# Patient Record
Sex: MALE | Race: WHITE | NOT HISPANIC OR LATINO | Employment: FULL TIME | URBAN - METROPOLITAN AREA
[De-identification: names, ages, dates, MRNs, and addresses within clinical notes are randomized per-mention and may not be internally consistent; named-entity substitution may affect disease eponyms.]

---

## 2017-01-06 ENCOUNTER — OFFICE VISIT (OUTPATIENT)
Dept: FAMILY MEDICINE CLINIC | Facility: CLINIC | Age: 52
End: 2017-01-06

## 2017-01-06 VITALS
BODY MASS INDEX: 36.95 KG/M2 | WEIGHT: 287.93 LBS | DIASTOLIC BLOOD PRESSURE: 80 MMHG | HEIGHT: 74 IN | SYSTOLIC BLOOD PRESSURE: 130 MMHG | HEART RATE: 82 BPM | OXYGEN SATURATION: 94 %

## 2017-01-06 DIAGNOSIS — E66.9 NON MORBID OBESITY, UNSPECIFIED OBESITY TYPE: Primary | ICD-10-CM

## 2017-01-06 PROBLEM — G47.33 OBSTRUCTIVE SLEEP APNEA SYNDROME: Status: ACTIVE | Noted: 2017-01-06

## 2017-01-06 LAB
ALBUMIN SERPL-MCNC: 5.1 G/DL (ref 3.5–5.2)
ALBUMIN/GLOB SERPL: 2.2 G/DL
ALP SERPL-CCNC: 65 U/L (ref 39–117)
ALT SERPL-CCNC: 48 U/L (ref 1–41)
AST SERPL-CCNC: 26 U/L (ref 1–40)
BILIRUB SERPL-MCNC: 0.6 MG/DL (ref 0.1–1.2)
BUN SERPL-MCNC: 14 MG/DL (ref 6–20)
BUN/CREAT SERPL: 13.6 (ref 7–25)
CALCIUM SERPL-MCNC: 9.9 MG/DL (ref 8.6–10.5)
CHLORIDE SERPL-SCNC: 99 MMOL/L (ref 98–107)
CHOLEST SERPL-MCNC: 269 MG/DL (ref 0–200)
CHOLEST/HDLC SERPL: 8.68 {RATIO}
CO2 SERPL-SCNC: 24.7 MMOL/L (ref 22–29)
CREAT SERPL-MCNC: 1.03 MG/DL (ref 0.76–1.27)
ERYTHROCYTE [DISTWIDTH] IN BLOOD BY AUTOMATED COUNT: 13.1 % (ref 11.5–14.5)
GLOBULIN SER CALC-MCNC: 2.3 GM/DL
GLUCOSE SERPL-MCNC: 106 MG/DL (ref 65–99)
HCT VFR BLD AUTO: 48 % (ref 40.4–52.2)
HDLC SERPL-MCNC: 31 MG/DL (ref 40–60)
HGB BLD-MCNC: 16.1 G/DL (ref 13.7–17.6)
LDLC SERPL CALC-MCNC: 196 MG/DL (ref 0–100)
MCH RBC QN AUTO: 32.4 PG (ref 27–32.7)
MCHC RBC AUTO-ENTMCNC: 33.5 G/DL (ref 32.6–36.4)
MCV RBC AUTO: 96.6 FL (ref 79.8–96.2)
PLATELET # BLD AUTO: 255 10*3/MM3 (ref 140–500)
POTASSIUM SERPL-SCNC: 4.5 MMOL/L (ref 3.5–5.2)
PROT SERPL-MCNC: 7.4 G/DL (ref 6–8.5)
RBC # BLD AUTO: 4.97 10*6/MM3 (ref 4.6–6)
SODIUM SERPL-SCNC: 139 MMOL/L (ref 136–145)
TRIGL SERPL-MCNC: 209 MG/DL (ref 0–150)
VLDLC SERPL CALC-MCNC: 41.8 MG/DL (ref 5–40)
WBC # BLD AUTO: 9.1 10*3/MM3 (ref 4.5–10.7)

## 2017-01-06 PROCEDURE — 99214 OFFICE O/P EST MOD 30 MIN: CPT | Performed by: NURSE PRACTITIONER

## 2017-01-06 RX ORDER — IBUPROFEN 200 MG
200 TABLET ORAL EVERY 6 HOURS PRN
COMMUNITY
End: 2018-04-26 | Stop reason: HOSPADM

## 2017-01-06 RX ORDER — UREA 10 %
10 LOTION (ML) TOPICAL NIGHTLY
COMMUNITY
End: 2020-02-10

## 2017-01-06 NOTE — PROGRESS NOTES
Subjective   Janak Morris is a 51 y.o. male who presents today for:    Apnea (NP)    HPI Comments: Mr. Morris presents today to establish care at this practice. He is a [employer] employee whom I have seen in the past for acute illnesses. His medical history includes sleep apnea and he uses a C- PAP when he sleeps at night. He has no other significant illnesses. He is not on any medication except for OTC medications to help him sleep. He reports no known allergies. His family history includes cancer, lung disease, kidney disease and alcoholism. Both of his parents are , his mother  due to an accident. He voices no concerns today.     I have reviewed the patient's medical history in detail and updated the computerized patient record.    Mr. Morris  reports that he has been smoking Cigarettes.  He has a 30.00 pack-year smoking history. He has never used smokeless tobacco. He reports that he drinks alcohol. He reports that he does not use illicit drugs.         Current Outpatient Prescriptions:   •  Doxylamine Succinate, Sleep, (SLEEP AID PO), Take  by mouth., Disp: , Rfl:   •  ibuprofen (ADVIL,MOTRIN) 200 MG tablet, Take 200 mg by mouth Every 6 (Six) Hours As Needed for mild pain (1-3)., Disp: , Rfl:   •  melatonin 1 MG tablet, Take 5 mg by mouth Every Night., Disp: , Rfl:       The following portions of the patient's history were reviewed and updated as appropriate: allergies, current medications, past social history and problem list.    Review of Systems   Constitutional: Negative.    HENT: Negative.    Eyes: Negative.    Respiratory: Negative.    Cardiovascular: Negative.    Gastrointestinal: Negative.    Endocrine: Negative.    Musculoskeletal: Negative.    Skin: Negative.    Neurological: Negative.    Psychiatric/Behavioral: Negative.          Objective   Vitals:    17 0907   BP: 130/80   BP Location: Right arm   Patient Position: Sitting   Cuff Size: Large Adult   Pulse: 82  "  SpO2: 94%   Weight: 287 lb 14.9 oz (131 kg)   Height: 74\" (188 cm)     Physical Exam   Constitutional: He is oriented to person, place, and time. He appears well-developed and well-nourished.   HENT:   Head: Normocephalic.   Right Ear: External ear normal.   Left Ear: External ear normal.   Nose: Nose normal.   Mouth/Throat: Oropharynx is clear and moist.   Eyes: Conjunctivae and EOM are normal. Pupils are equal, round, and reactive to light.   Neck: Normal range of motion. Neck supple. No JVD present. No tracheal deviation present. No thyromegaly present.   Cardiovascular: Normal rate, regular rhythm, normal heart sounds and intact distal pulses.    Pulmonary/Chest: Effort normal and breath sounds normal.   Abdominal: Soft. Bowel sounds are normal. He exhibits no distension and no mass. There is no tenderness. There is no rebound and no guarding. No hernia.   Musculoskeletal: Normal range of motion. He exhibits no edema, tenderness or deformity.   Lymphadenopathy:     He has no cervical adenopathy.   Neurological: He is alert and oriented to person, place, and time.   Skin: Skin is warm and dry.   Psychiatric:   No acute distress.   Vitals reviewed.        Assessment/Plan   Janak was seen today for apnea.    Diagnoses and all orders for this visit:    Non morbid obesity, unspecified obesity type  -     CBC (No Diff)  -     Comprehensive Metabolic Panel  -     Lipid Panel With / Chol / HDL Ratio    1. Physical examination is within normal limits. BMI is 37 %.   2. Labs as noted above. I will call him with the results and make changes to his plan of care if indicated.  3. I will see him in 1 year or as needed.  "

## 2017-01-06 NOTE — MR AVS SNAPSHOT
"Janak Morris   1/6/2017 9:00 AM   Office Visit    Dept Phone:  [phone]   Encounter #:  [id number]    Provider:  BASIL Dangelo   Department:  [facility] INTERNAL MEDICINE                Your Full Care Plan              Your Updated Medication List          This list is accurate as of: 1/6/17  9:31 AM.  Always use your most recent med list.                ibuprofen 200 MG tablet   Commonly known as:  ADVIL,MOTRIN       melatonin 1 MG tablet       SLEEP AID PO               We Performed the Following     CBC (No Diff)     Comprehensive Metabolic Panel     Lipid Panel With / Chol / HDL Ratio       You Were Diagnosed With        Codes Comments    Non morbid obesity, unspecified obesity type    -  Primary ICD-10-CM: E66.9  ICD-9-CM: 278.00       Instructions     None    Patient Instructions History      Upcoming Appointments     Visit Type Date Time Department    NEW PATIENT 1/6/2017  9:00 AM BERE JONES      [facility] Signup     Our records indicate that you have declined [facility] signup. If you would like to sign up for [employer], please email [email or web address] or call [phone] to obtain an activation code.             Other Info from Your Visit           Allergies     No Known Allergies      Reason for Visit     Apnea NP      Vital Signs     Blood Pressure Pulse Height Weight Oxygen Saturation Body Mass Index    130/80 (BP Location: Right arm, Patient Position: Sitting, Cuff Size: Large Adult) 82 74\" (188 cm) 287 lb 14.9 oz (131 kg) 94% 36.97 kg/m2    Smoking Status                   Current Every Day Smoker           Problems and Diagnoses Noted     Non morbid obesity, unspecified obesity type    -  Primary        "

## 2017-01-19 ENCOUNTER — OFFICE VISIT (OUTPATIENT)
Dept: FAMILY MEDICINE CLINIC | Facility: CLINIC | Age: 52
End: 2017-01-19

## 2017-01-19 VITALS
HEART RATE: 85 BPM | OXYGEN SATURATION: 97 % | DIASTOLIC BLOOD PRESSURE: 84 MMHG | SYSTOLIC BLOOD PRESSURE: 132 MMHG | HEIGHT: 74 IN | BODY MASS INDEX: 37.55 KG/M2 | WEIGHT: 292.6 LBS

## 2017-01-19 DIAGNOSIS — E78.5 HYPERLIPIDEMIA, UNSPECIFIED HYPERLIPIDEMIA TYPE: Primary | ICD-10-CM

## 2017-01-19 PROCEDURE — 99213 OFFICE O/P EST LOW 20 MIN: CPT | Performed by: NURSE PRACTITIONER

## 2017-01-19 NOTE — PROGRESS NOTES
Subjective   Janak Morris is a 51 y.o. male who presents today for:    Hyperlipidemia (Review Labs)    HPI Comments: Mr. Morris is here today to review and discuss his lab results. He reports a history of hyperlipidemia.    Hyperlipidemia   This is a chronic problem. The current episode started more than 1 year ago. The problem is uncontrolled. Recent lipid tests were reviewed and are high. Exacerbating diseases include obesity. Factors aggravating his hyperlipidemia include fatty foods. (None) Treatments tried: none. The current treatment provides no improvement of lipids. Compliance problems include adherence to diet (he said he won't take cholesterol medications).  Risk factors for coronary artery disease include dyslipidemia, male sex and obesity.      I have reviewed the patient's medical history in detail and updated the computerized patient record.    Mr. Morris  reports that he has been smoking Cigarettes.  He has a 30.00 pack-year smoking history. He has never used smokeless tobacco. He reports that he drinks alcohol. He reports that he does not use illicit drugs.         Current Outpatient Prescriptions:   •  Doxylamine Succinate, Sleep, (SLEEP AID PO), Take  by mouth., Disp: , Rfl:   •  ibuprofen (ADVIL,MOTRIN) 200 MG tablet, Take 200 mg by mouth Every 6 (Six) Hours As Needed for mild pain (1-3)., Disp: , Rfl:   •  melatonin 1 MG tablet, Take 5 mg by mouth Every Night., Disp: , Rfl:       The following portions of the patient's history were reviewed and updated as appropriate: allergies, current medications, past social history and problem list.    Review of Systems   Constitutional: Negative.    Respiratory: Negative.    Cardiovascular: Negative.    Skin: Negative.    Neurological: Negative.          Objective   Vitals:    01/19/17 0809   BP: 132/84   BP Location: Left arm   Patient Position: Sitting   Cuff Size: Large Adult   Pulse: 85   SpO2: 97%   Weight: 292 lb 9.6 oz (133 kg)   Height:  "74\" (188 cm)     Physical Exam   Constitutional: He is oriented to person, place, and time. He appears well-developed and well-nourished.   Cardiovascular: Normal rate, regular rhythm, normal heart sounds and intact distal pulses.    Pulmonary/Chest: Effort normal and breath sounds normal.   Neurological: He is alert and oriented to person, place, and time.   Skin: Skin is warm and dry.   Vitals reviewed.        Assessment/Plan   Janak was seen today for hyperlipidemia.    Diagnoses and all orders for this visit:    Hyperlipidemia, unspecified hyperlipidemia type  -     Lipid Panel With / Chol / HDL Ratio; Future  -     Basic Metabolic Panel; Future    1. I have reviewed his labs with him today focusing on his lipid levels. He states the levels are actually lower than what they were in the past. He told me that he will not take cholesterol lowering medications nor will he make drastic life style changes such as decreasing/limiting the amounts of meats in his diet. 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  2. He is to return in 6 months to follow up on his hyperlipidemia.  "

## 2017-01-19 NOTE — MR AVS SNAPSHOT
"Janak Morris   1/19/2017 8:00 AM   Office Visit    Dept Phone:  [phone]   Encounter #:  [id number]    Provider:  BASIL Dangelo   Department:  [facility] INTERNAL MEDICINE                Your Full Care Plan              Your Updated Medication List          This list is accurate as of: 1/19/17  8:26 AM.  Always use your most recent med list.                ibuprofen 200 MG tablet   Commonly known as:  ADVIL,MOTRIN       melatonin 1 MG tablet       SLEEP AID PO               You Were Diagnosed With        Codes Comments    Hyperlipidemia, unspecified hyperlipidemia type    -  Primary ICD-10-CM: E78.5  ICD-9-CM: 272.4       Instructions    Fasting labs one week prior to next scheduled office visit.     Patient Instructions History      Upcoming Appointments     Visit Type Date Time Department    OFFICE VISIT 1/19/2017  8:00 AM [facility]    PHYSICAL 1/16/2018  8:00 AM [email or web address] Signup     Our records indicate that you have declined [facility] signup. If you would like to sign up for [employer], please email [email or web address] or call [phone] to obtain an activation code.             Other Info from Your Visit           Your Appointments     Jan 16, 2018  8:00 AM EST   Physical with BASIL Dangelo   [facility] INTERNAL MEDICINE (--)    [address] [place] [address]   [phone]           Arrive 15 minutes prior to appointment.              Allergies     No Known Allergies      Reason for Visit     Hyperlipidemia Review Labs      Vital Signs     Blood Pressure Pulse Height Weight Oxygen Saturation Body Mass Index    132/84 (BP Location: Left arm, Patient Position: Sitting, Cuff Size: Large Adult) 85 74\" (188 cm) 292 lb 9.6 oz (133 kg) 97% 37.57 kg/m2    Smoking Status                   Current Every Day Smoker           Problems and Diagnoses Noted    "    High cholesterol or triglycerides    -  Primary

## 2017-06-19 ENCOUNTER — OFFICE VISIT (OUTPATIENT)
Dept: FAMILY MEDICINE CLINIC | Facility: CLINIC | Age: 52
End: 2017-06-19

## 2017-06-19 VITALS
BODY MASS INDEX: 37.94 KG/M2 | SYSTOLIC BLOOD PRESSURE: 138 MMHG | WEIGHT: 295.6 LBS | HEART RATE: 76 BPM | HEIGHT: 74 IN | TEMPERATURE: 98.1 F | OXYGEN SATURATION: 90 % | DIASTOLIC BLOOD PRESSURE: 82 MMHG

## 2017-06-19 DIAGNOSIS — I10 ESSENTIAL HYPERTENSION: Primary | ICD-10-CM

## 2017-06-19 PROCEDURE — 99213 OFFICE O/P EST LOW 20 MIN: CPT | Performed by: NURSE PRACTITIONER

## 2017-06-19 RX ORDER — HYDROCODONE BITARTRATE AND ACETAMINOPHEN 5; 325 MG/1; MG/1
1 TABLET ORAL EVERY 6 HOURS PRN
COMMUNITY
End: 2018-01-19

## 2017-06-19 RX ORDER — LISINOPRIL 10 MG/1
10 TABLET ORAL DAILY
Qty: 30 TABLET | Refills: 5 | Status: SHIPPED | OUTPATIENT
Start: 2017-06-19 | End: 2017-10-24 | Stop reason: SDUPTHER

## 2017-06-19 NOTE — PROGRESS NOTES
Subjective   Janak Morris is a 52 y.o. male who presents today for:    Hypertension    Hypertension   This is a chronic problem. The current episode started more than 1 year ago. The problem has been waxing and waning since onset. The problem is uncontrolled. Associated symptoms include anxiety, blurred vision (slightly), headaches (at times), malaise/fatigue (feels more tired than usual) and shortness of breath (mild with heavy exertion). Pertinent negatives include no chest pain, palpitations or peripheral edema. Agents associated with hypertension include NSAIDs. Risk factors for coronary artery disease include male gender and obesity. Past treatments include nothing. The current treatment provides no improvement. Compliance problems include exercise and diet.       I have reviewed the patient's medical history in detail and updated the computerized patient record.    Mr. Morris  reports that he has been smoking Cigarettes.  He has a 30.00 pack-year smoking history. He has never used smokeless tobacco. He reports that he drinks alcohol. He reports that he does not use illicit drugs.         Current Outpatient Prescriptions:   •  Doxylamine Succinate, Sleep, (SLEEP AID PO), Take  by mouth., Disp: , Rfl:   •  ibuprofen (ADVIL,MOTRIN) 200 MG tablet, Take 200 mg by mouth Every 6 (Six) Hours As Needed for mild pain (1-3)., Disp: , Rfl:   •  melatonin 1 MG tablet, Take 5 mg by mouth Every Night., Disp: , Rfl:   •  HYDROcodone-acetaminophen (NORCO) 5-325 MG per tablet, Take 1 tablet by mouth Every 6 (Six) Hours As Needed., Disp: , Rfl:       The following portions of the patient's history were reviewed and updated as appropriate: allergies, current medications, past social history and problem list.    Review of Systems   Constitutional: Positive for malaise/fatigue (feels more tired than usual). Negative for fatigue.   Eyes: Positive for blurred vision (slightly).   Respiratory: Positive for shortness of  "breath (mild with heavy exertion).    Cardiovascular: Negative.  Negative for chest pain, palpitations and leg swelling.   Skin: Negative.    Neurological: Positive for headaches (at times).         Objective   Vitals:    06/19/17 0809   BP: 138/82   BP Location: Left arm   Patient Position: Sitting   Cuff Size: Adult   Pulse: 76   Temp: 98.1 °F (36.7 °C)   TempSrc: Oral   SpO2: 90%   Weight: 295 lb 9.6 oz (134 kg)   Height: 74\" (188 cm)     Physical Exam   Constitutional: He is oriented to person, place, and time. He appears well-developed and well-nourished.   Eyes: Conjunctivae and EOM are normal. Pupils are equal, round, and reactive to light.   Neck: Normal range of motion. Neck supple. No JVD present. No tracheal deviation present. No thyromegaly present.   Cardiovascular: Normal rate, regular rhythm, normal heart sounds and intact distal pulses.  Exam reveals no gallop and no friction rub.    No murmur heard.  Pulmonary/Chest: Effort normal and breath sounds normal. No respiratory distress. He has no wheezes. He has no rales.   Musculoskeletal: Normal range of motion. He exhibits no edema.   Lymphadenopathy:     He has no cervical adenopathy.   Neurological: He is alert and oriented to person, place, and time.   Skin: Skin is warm and dry.   Psychiatric:   No acute distress   Vitals reviewed.        Assessment/Plan   Janak was seen today for hypertension.    Diagnoses and all orders for this visit:    Essential hypertension  -     lisinopril (PRINIVIL,ZESTRIL) 10 MG tablet; Take 1 tablet by mouth Daily.    1. He reports his blood pressure is running in the 150/80 to 170/90 when at home. Worsening over the past month or so. I will start him on Lisinopril 10 mg daily. I have also advised him to substitute Acetaminophen for the Ibuprofen he is taking for pain. I have asked him to continue to keep a log of his blood pressure.   2. He is to return in 1 month at his next scheduled appointment with fasting " labs the week before.

## 2017-07-01 ENCOUNTER — RESULTS ENCOUNTER (OUTPATIENT)
Dept: FAMILY MEDICINE CLINIC | Facility: CLINIC | Age: 52
End: 2017-07-01

## 2017-07-01 DIAGNOSIS — E78.5 HYPERLIPIDEMIA, UNSPECIFIED HYPERLIPIDEMIA TYPE: ICD-10-CM

## 2017-07-13 LAB
BUN SERPL-MCNC: 13 MG/DL (ref 6–20)
BUN/CREAT SERPL: 14.6 (ref 7–25)
CALCIUM SERPL-MCNC: 9.8 MG/DL (ref 8.6–10.5)
CHLORIDE SERPL-SCNC: 101 MMOL/L (ref 98–107)
CHOLEST SERPL-MCNC: 238 MG/DL (ref 0–200)
CHOLEST/HDLC SERPL: 7.93 {RATIO}
CO2 SERPL-SCNC: 24.5 MMOL/L (ref 22–29)
CREAT SERPL-MCNC: 0.89 MG/DL (ref 0.76–1.27)
GLUCOSE SERPL-MCNC: 112 MG/DL (ref 65–99)
HDLC SERPL-MCNC: 30 MG/DL (ref 40–60)
LDLC SERPL CALC-MCNC: 167 MG/DL (ref 0–100)
POTASSIUM SERPL-SCNC: 4.2 MMOL/L (ref 3.5–5.2)
SODIUM SERPL-SCNC: 139 MMOL/L (ref 136–145)
TRIGL SERPL-MCNC: 207 MG/DL (ref 0–150)
VLDLC SERPL CALC-MCNC: 41.4 MG/DL (ref 5–40)

## 2017-07-17 RX ORDER — SIMVASTATIN 20 MG
20 TABLET ORAL NIGHTLY
Qty: 30 TABLET | Refills: 5 | Status: SHIPPED | OUTPATIENT
Start: 2017-07-17 | End: 2017-07-19 | Stop reason: ALTCHOICE

## 2017-07-19 ENCOUNTER — OFFICE VISIT (OUTPATIENT)
Dept: FAMILY MEDICINE CLINIC | Facility: CLINIC | Age: 52
End: 2017-07-19

## 2017-07-19 VITALS
BODY MASS INDEX: 36.57 KG/M2 | HEIGHT: 74 IN | OXYGEN SATURATION: 93 % | DIASTOLIC BLOOD PRESSURE: 76 MMHG | WEIGHT: 285 LBS | SYSTOLIC BLOOD PRESSURE: 120 MMHG | HEART RATE: 67 BPM

## 2017-07-19 DIAGNOSIS — I10 ESSENTIAL HYPERTENSION: ICD-10-CM

## 2017-07-19 DIAGNOSIS — E78.5 HYPERLIPIDEMIA, UNSPECIFIED HYPERLIPIDEMIA TYPE: Primary | ICD-10-CM

## 2017-07-19 PROCEDURE — 99214 OFFICE O/P EST MOD 30 MIN: CPT | Performed by: NURSE PRACTITIONER

## 2017-07-19 RX ORDER — ROSUVASTATIN CALCIUM 5 MG/1
5 TABLET, COATED ORAL DAILY
Qty: 30 TABLET | Refills: 5 | Status: SHIPPED | OUTPATIENT
Start: 2017-07-19 | End: 2017-10-24 | Stop reason: SDUPTHER

## 2017-07-19 NOTE — PROGRESS NOTES
Subjective   Janak Morris is a 52 y.o. male.     Hypertension   This is a chronic problem. The current episode started more than 1 month ago. The problem has been gradually improving since onset. The problem is controlled. Associated symptoms include headaches (rarely). Pertinent negatives include no anxiety, blurred vision, chest pain, palpitations, peripheral edema or shortness of breath. Agents associated with hypertension include NSAIDs. Risk factors for coronary artery disease include male gender and obesity. Past treatments include ACE inhibitors. The current treatment provides significant improvement. There are no compliance problems.    Hyperlipidemia   This is a new problem. This is a new diagnosis. The problem is uncontrolled. Recent lipid tests were reviewed and are high. Pertinent negatives include no chest pain or shortness of breath. He is currently on no antihyperlipidemic treatment. The current treatment provides no improvement of lipids. There are no compliance problems.  Risk factors for coronary artery disease include hypertension, dyslipidemia, male sex and obesity.     I have reviewed the patient's medical history in detail and updated the computerized patient record.    The following portions of the patient's history were reviewed and updated as appropriate: allergies, current medications, past family history, past medical history, past social history, past surgical history and problem list.      Current Outpatient Prescriptions:   •  Doxylamine Succinate, Sleep, (SLEEP AID PO), Take  by mouth., Disp: , Rfl:   •  ibuprofen (ADVIL,MOTRIN) 200 MG tablet, Take 200 mg by mouth Every 6 (Six) Hours As Needed for mild pain (1-3)., Disp: , Rfl:   •  lisinopril (PRINIVIL,ZESTRIL) 10 MG tablet, Take 1 tablet by mouth Daily., Disp: 30 tablet, Rfl: 5  •  melatonin 1 MG tablet, Take 5 mg by mouth Every Night., Disp: , Rfl:   •  HYDROcodone-acetaminophen (NORCO) 5-325 MG per tablet, Take 1 tablet by  mouth Every 6 (Six) Hours As Needed., Disp: , Rfl:   •  rosuvastatin (CRESTOR) 5 MG tablet, Take 1 tablet by mouth Daily., Disp: 30 tablet, Rfl: 5     Review of Systems   Constitutional: Negative.    Eyes: Negative for blurred vision.   Respiratory: Negative.  Negative for shortness of breath.    Cardiovascular: Negative.  Negative for chest pain, palpitations and leg swelling.   Musculoskeletal: Negative.    Skin: Negative.    Neurological: Positive for headaches (rarely). Negative for dizziness, weakness, light-headedness and numbness.       Objective    Vitals:    07/19/17 0806   BP: 120/76   Pulse: 67   SpO2: 93%     Physical Exam   Constitutional: He is oriented to person, place, and time. He appears well-developed and well-nourished.   Cardiovascular: Normal rate, regular rhythm, normal heart sounds and intact distal pulses.  Exam reveals no gallop and no friction rub.    No murmur heard.  Pulmonary/Chest: Effort normal and breath sounds normal. No respiratory distress. He has no wheezes. He has no rales.   Musculoskeletal: Normal range of motion. He exhibits no edema.   Neurological: He is alert and oriented to person, place, and time.   Skin: Skin is warm and dry.   Psychiatric:   No acute distress   Vitals reviewed.      Assessment/Plan   Janak was seen today for hypertension and hyperlipidemia.    Diagnoses and all orders for this visit:    Hyperlipidemia, unspecified hyperlipidemia type  -     rosuvastatin (CRESTOR) 5 MG tablet; Take 1 tablet by mouth Daily.  -     Lipid Panel With / Chol / HDL Ratio; Future    Essential hypertension       1. Hypertension. His blood pressure today in the office is 120/76 and his HR is 76. He is to continue with Lisinopril 10 mg daily. He is also to continue with life style changes as we discussed through nutrition and increasing daily activity beyond what he does at work.   2. Hyperlipidemia. I have reviewed his labs with him today. All of his lipid levels are above  normal limits. I had prescribed him Simvastatin to start, but patient does not want to take it, he would prefer taking Rosuvastatin. I will start him on Rosuvastatin 5 mg daily.   3. He is to return in 6 months for follow up on blood pressure and lipids. He is to have fasting labs the week before.

## 2017-08-18 ENCOUNTER — TREATMENT (OUTPATIENT)
Dept: PHYSICAL THERAPY | Facility: CLINIC | Age: 52
End: 2017-08-18

## 2017-08-18 DIAGNOSIS — Z02.1 PRE-EMPLOYMENT HEALTH SCREENING EXAMINATION: Primary | ICD-10-CM

## 2017-08-18 PROCEDURE — PDS: Performed by: PHYSICAL THERAPIST

## 2017-10-24 DIAGNOSIS — I10 ESSENTIAL HYPERTENSION: ICD-10-CM

## 2017-10-24 DIAGNOSIS — E78.5 HYPERLIPIDEMIA, UNSPECIFIED HYPERLIPIDEMIA TYPE: ICD-10-CM

## 2017-10-24 RX ORDER — ROSUVASTATIN CALCIUM 5 MG/1
5 TABLET, COATED ORAL DAILY
Qty: 10 TABLET | Refills: 0 | Status: SHIPPED | OUTPATIENT
Start: 2017-10-24 | End: 2017-11-02 | Stop reason: SDUPTHER

## 2017-10-24 RX ORDER — LISINOPRIL 10 MG/1
10 TABLET ORAL DAILY
Qty: 10 TABLET | Refills: 0 | Status: SHIPPED | OUTPATIENT
Start: 2017-10-24 | End: 2017-11-02 | Stop reason: SDUPTHER

## 2017-11-02 DIAGNOSIS — I10 ESSENTIAL HYPERTENSION: ICD-10-CM

## 2017-11-02 DIAGNOSIS — E78.5 HYPERLIPIDEMIA, UNSPECIFIED HYPERLIPIDEMIA TYPE: ICD-10-CM

## 2017-11-02 RX ORDER — ROSUVASTATIN CALCIUM 5 MG/1
TABLET, COATED ORAL
Qty: 10 TABLET | Refills: 0 | Status: SHIPPED | OUTPATIENT
Start: 2017-11-02 | End: 2017-11-14 | Stop reason: SDUPTHER

## 2017-11-02 RX ORDER — LISINOPRIL 10 MG/1
TABLET ORAL
Qty: 10 TABLET | Refills: 0 | Status: SHIPPED | OUTPATIENT
Start: 2017-11-02 | End: 2017-11-14 | Stop reason: SDUPTHER

## 2017-11-14 DIAGNOSIS — I10 ESSENTIAL HYPERTENSION: ICD-10-CM

## 2017-11-14 DIAGNOSIS — E78.5 HYPERLIPIDEMIA, UNSPECIFIED HYPERLIPIDEMIA TYPE: ICD-10-CM

## 2017-11-14 RX ORDER — ROSUVASTATIN CALCIUM 5 MG/1
5 TABLET, COATED ORAL DAILY
Qty: 90 TABLET | Refills: 1 | Status: SHIPPED | OUTPATIENT
Start: 2017-11-14 | End: 2018-05-29 | Stop reason: SDUPTHER

## 2017-11-14 RX ORDER — LISINOPRIL 10 MG/1
10 TABLET ORAL DAILY
Qty: 90 TABLET | Refills: 1 | Status: SHIPPED | OUTPATIENT
Start: 2017-11-14 | End: 2018-05-29 | Stop reason: SDUPTHER

## 2018-01-01 ENCOUNTER — RESULTS ENCOUNTER (OUTPATIENT)
Dept: FAMILY MEDICINE CLINIC | Facility: CLINIC | Age: 53
End: 2018-01-01

## 2018-01-01 DIAGNOSIS — E78.5 HYPERLIPIDEMIA, UNSPECIFIED HYPERLIPIDEMIA TYPE: ICD-10-CM

## 2018-01-19 ENCOUNTER — OFFICE VISIT (OUTPATIENT)
Dept: FAMILY MEDICINE CLINIC | Facility: CLINIC | Age: 53
End: 2018-01-19

## 2018-01-19 VITALS
WEIGHT: 289.4 LBS | HEART RATE: 85 BPM | HEIGHT: 74 IN | OXYGEN SATURATION: 95 % | SYSTOLIC BLOOD PRESSURE: 120 MMHG | TEMPERATURE: 98.1 F | DIASTOLIC BLOOD PRESSURE: 72 MMHG | BODY MASS INDEX: 37.14 KG/M2

## 2018-01-19 DIAGNOSIS — Z00.00 ENCOUNTER FOR ANNUAL PHYSICAL EXAM: ICD-10-CM

## 2018-01-19 DIAGNOSIS — R73.01 ELEVATED FASTING GLUCOSE: ICD-10-CM

## 2018-01-19 DIAGNOSIS — E78.5 HYPERLIPIDEMIA, UNSPECIFIED HYPERLIPIDEMIA TYPE: Primary | ICD-10-CM

## 2018-01-19 DIAGNOSIS — I10 ESSENTIAL HYPERTENSION: ICD-10-CM

## 2018-01-19 DIAGNOSIS — Z12.11 SCREENING FOR COLON CANCER: ICD-10-CM

## 2018-01-19 DIAGNOSIS — Z12.5 SCREENING FOR PROSTATE CANCER: ICD-10-CM

## 2018-01-19 LAB
ALBUMIN SERPL-MCNC: 4.6 G/DL (ref 3.5–5.2)
ALBUMIN/GLOB SERPL: 1.8 G/DL
ALP SERPL-CCNC: 69 U/L (ref 39–117)
ALT SERPL-CCNC: 37 U/L (ref 1–41)
AST SERPL-CCNC: 20 U/L (ref 1–40)
BILIRUB SERPL-MCNC: 0.6 MG/DL (ref 0.1–1.2)
BUN SERPL-MCNC: 9 MG/DL (ref 6–20)
BUN/CREAT SERPL: 10.2 (ref 7–25)
CALCIUM SERPL-MCNC: 9.7 MG/DL (ref 8.6–10.5)
CHLORIDE SERPL-SCNC: 102 MMOL/L (ref 98–107)
CHOLEST SERPL-MCNC: 150 MG/DL (ref 0–200)
CHOLEST/HDLC SERPL: 4.69 {RATIO}
CO2 SERPL-SCNC: 25.9 MMOL/L (ref 22–29)
CREAT SERPL-MCNC: 0.88 MG/DL (ref 0.76–1.27)
ERYTHROCYTE [DISTWIDTH] IN BLOOD BY AUTOMATED COUNT: 13.3 % (ref 11.5–14.5)
GLOBULIN SER CALC-MCNC: 2.6 GM/DL
GLUCOSE SERPL-MCNC: 101 MG/DL (ref 65–99)
HBA1C MFR BLD: 5.2 % (ref 4.8–5.6)
HCT VFR BLD AUTO: 46.6 % (ref 40.4–52.2)
HDLC SERPL-MCNC: 32 MG/DL (ref 40–60)
HGB BLD-MCNC: 15.6 G/DL (ref 13.7–17.6)
LDLC SERPL CALC-MCNC: 89 MG/DL (ref 0–100)
MCH RBC QN AUTO: 32.6 PG (ref 27–32.7)
MCHC RBC AUTO-ENTMCNC: 33.5 G/DL (ref 32.6–36.4)
MCV RBC AUTO: 97.3 FL (ref 79.8–96.2)
PLATELET # BLD AUTO: 236 10*3/MM3 (ref 140–500)
POTASSIUM SERPL-SCNC: 4.5 MMOL/L (ref 3.5–5.2)
PROT SERPL-MCNC: 7.2 G/DL (ref 6–8.5)
PSA SERPL-MCNC: 0.29 NG/ML (ref 0–4)
RBC # BLD AUTO: 4.79 10*6/MM3 (ref 4.6–6)
SODIUM SERPL-SCNC: 142 MMOL/L (ref 136–145)
TRIGL SERPL-MCNC: 147 MG/DL (ref 0–150)
VLDLC SERPL CALC-MCNC: 29.4 MG/DL (ref 5–40)
WBC # BLD AUTO: 8.11 10*3/MM3 (ref 4.5–10.7)

## 2018-01-19 PROCEDURE — 99396 PREV VISIT EST AGE 40-64: CPT | Performed by: NURSE PRACTITIONER

## 2018-01-19 NOTE — PROGRESS NOTES
"Subjective   Janak Morris is a 52 y.o. male who presents today for:    Annual Exam ([facility])    HPI Comments: Mr. Morris presents today for his annual physical examination. He denies any concerns today. He continues to take Lisinopril 10 mg daily for his hypertension and Crestor 5 mg daily for hyperlipidemia. His lipid levels had improved slightly on the Crestor.      I have reviewed the patient's medical history in detail and updated the computerized patient record.    Mr. Morris  reports that he has been smoking Cigarettes.  He has a 30.00 pack-year smoking history. He has never used smokeless tobacco. He reports that he drinks alcohol. He reports that he does not use illicit drugs.     No Known Allergies    Current Outpatient Prescriptions:   •  Doxylamine Succinate, Sleep, (SLEEP AID PO), Take  by mouth., Disp: , Rfl:   •  ibuprofen (ADVIL,MOTRIN) 200 MG tablet, Take 200 mg by mouth Every 6 (Six) Hours As Needed for mild pain (1-3)., Disp: , Rfl:   •  lisinopril (PRINIVIL,ZESTRIL) 10 MG tablet, Take 1 tablet by mouth Daily., Disp: 90 tablet, Rfl: 1  •  melatonin 1 MG tablet, Take 5 mg by mouth Every Night., Disp: , Rfl:   •  rosuvastatin (CRESTOR) 5 MG tablet, Take 1 tablet by mouth Daily., Disp: 90 tablet, Rfl: 1      Review of Systems   Constitutional: Negative.    HENT: Negative.    Eyes: Negative.    Respiratory: Negative.    Cardiovascular: Negative.    Gastrointestinal: Negative.    Endocrine: Negative.    Genitourinary: Negative.    Musculoskeletal: Negative.    Skin: Negative.    Neurological: Negative.    Psychiatric/Behavioral: Negative.          Objective   Vitals:    01/19/18 0855   BP: 120/72   BP Location: Left arm   Patient Position: Sitting   Cuff Size: Large Adult   Pulse: 85   Temp: 98.1 °F (36.7 °C)   TempSrc: Oral   SpO2: 95%   Weight: 131 kg (289 lb 6.4 oz)   Height: 188 cm (74.02\")     Physical Exam   Constitutional: He is oriented to person, place, and time. He appears " well-developed and well-nourished.   HENT:   Head: Normocephalic.   Right Ear: External ear normal.   Left Ear: External ear normal.   Mouth/Throat: Oropharynx is clear and moist.   Eyes: Conjunctivae and EOM are normal. Pupils are equal, round, and reactive to light.   Neck: Normal range of motion. Neck supple. No JVD present. No tracheal deviation present. No thyromegaly present.   Cardiovascular: Normal rate, regular rhythm, normal heart sounds and intact distal pulses.  Exam reveals no gallop and no friction rub.    No murmur heard.  Pulmonary/Chest: Effort normal and breath sounds normal. No respiratory distress. He has no wheezes. He has no rales.   Musculoskeletal: He exhibits no edema or tenderness.   Lymphadenopathy:     He has no cervical adenopathy.   Neurological: He is alert and oriented to person, place, and time.   Skin: Skin is warm and dry.   Psychiatric:   No acute distress   Vitals reviewed.        Assessment/Plan   Janak was seen today for annual exam.    Diagnoses and all orders for this visit:    Hyperlipidemia, unspecified hyperlipidemia type  -     Lipid Panel With / Chol / HDL Ratio    Essential hypertension  -     CBC (No Diff)    Elevated fasting glucose  -     Comprehensive Metabolic Panel  -     Hemoglobin A1c    Screening for colon cancer  -     Occult Blood, Fecal By Immunoassay - Stool, Per Rectum    Screening for prostate cancer  -     PSA DIAGNOSTIC    Encounter for annual physical exam    1. Physical exam is within normal limits.  2. His blood pressure today is 120/72 which is unchanged from his last visit in July. He is to continue on Lisinopril 10 mg daily.  3. He is to continue on C-PAP for his sleep apnea as prescribed.  4. Fasting labs as noted above to be done today along with PSA and occult blood, fecal by immunoassay ( kit was sent home with the patient).  5. He is to follow up in 1 year or sooner if changes needed to be made to his plan of care.

## 2018-01-22 NOTE — PROGRESS NOTES
Please call this patient and let him know his labs are normal. Lipid levels are greatly improved. Continue with the medications as prescribed.

## 2018-02-23 DIAGNOSIS — Z12.11 SCREENING FOR COLON CANCER: Primary | ICD-10-CM

## 2018-04-09 ENCOUNTER — PREP FOR SURGERY (OUTPATIENT)
Dept: OTHER | Facility: HOSPITAL | Age: 53
End: 2018-04-09

## 2018-04-09 DIAGNOSIS — R19.5 POSITIVE COLORECTAL CANCER SCREENING USING DNA-BASED STOOL TEST: Primary | ICD-10-CM

## 2018-04-09 RX ORDER — SODIUM CHLORIDE, SODIUM LACTATE, POTASSIUM CHLORIDE, CALCIUM CHLORIDE 600; 310; 30; 20 MG/100ML; MG/100ML; MG/100ML; MG/100ML
30 INJECTION, SOLUTION INTRAVENOUS CONTINUOUS
Status: CANCELLED | OUTPATIENT
Start: 2018-04-26

## 2018-04-16 PROBLEM — R19.5 POSITIVE COLORECTAL CANCER SCREENING USING DNA-BASED STOOL TEST: Status: ACTIVE | Noted: 2018-04-16

## 2018-04-26 ENCOUNTER — ANESTHESIA (OUTPATIENT)
Dept: GASTROENTEROLOGY | Facility: HOSPITAL | Age: 53
End: 2018-04-26

## 2018-04-26 ENCOUNTER — TELEPHONE (OUTPATIENT)
Dept: GASTROENTEROLOGY | Facility: CLINIC | Age: 53
End: 2018-04-26

## 2018-04-26 ENCOUNTER — PREP FOR SURGERY (OUTPATIENT)
Dept: OTHER | Facility: HOSPITAL | Age: 53
End: 2018-04-26

## 2018-04-26 ENCOUNTER — HOSPITAL ENCOUNTER (OUTPATIENT)
Facility: HOSPITAL | Age: 53
Setting detail: HOSPITAL OUTPATIENT SURGERY
Discharge: HOME | End: 2018-04-26
Attending: INTERNAL MEDICINE | Admitting: INTERNAL MEDICINE

## 2018-04-26 ENCOUNTER — ANESTHESIA EVENT (OUTPATIENT)
Dept: GASTROENTEROLOGY | Facility: HOSPITAL | Age: 53
End: 2018-04-26

## 2018-04-26 VITALS
WEIGHT: 292 LBS | OXYGEN SATURATION: 98 % | BODY MASS INDEX: 37.47 KG/M2 | SYSTOLIC BLOOD PRESSURE: 113 MMHG | HEART RATE: 74 BPM | HEIGHT: 74 IN | TEMPERATURE: 97.7 F | RESPIRATION RATE: 16 BRPM | DIASTOLIC BLOOD PRESSURE: 55 MMHG

## 2018-04-26 DIAGNOSIS — R19.5 POSITIVE COLORECTAL CANCER SCREENING USING DNA-BASED STOOL TEST: Primary | ICD-10-CM

## 2018-04-26 DIAGNOSIS — R19.5 POSITIVE COLORECTAL CANCER SCREENING USING DNA-BASED STOOL TEST: ICD-10-CM

## 2018-04-26 PROCEDURE — 25010000002 PROPOFOL 10 MG/ML EMULSION: Performed by: ANESTHESIOLOGY

## 2018-04-26 PROCEDURE — 88305 TISSUE EXAM BY PATHOLOGIST: CPT | Performed by: INTERNAL MEDICINE

## 2018-04-26 PROCEDURE — 45380 COLONOSCOPY AND BIOPSY: CPT | Performed by: INTERNAL MEDICINE

## 2018-04-26 RX ORDER — PROPOFOL 10 MG/ML
VIAL (ML) INTRAVENOUS CONTINUOUS PRN
Status: DISCONTINUED | OUTPATIENT
Start: 2018-04-26 | End: 2018-04-26 | Stop reason: SURG

## 2018-04-26 RX ORDER — PROMETHAZINE HYDROCHLORIDE 25 MG/ML
12.5 INJECTION, SOLUTION INTRAMUSCULAR; INTRAVENOUS ONCE AS NEEDED
Status: DISCONTINUED | OUTPATIENT
Start: 2018-04-26 | End: 2018-04-26 | Stop reason: HOSPADM

## 2018-04-26 RX ORDER — SODIUM CHLORIDE, SODIUM LACTATE, POTASSIUM CHLORIDE, CALCIUM CHLORIDE 600; 310; 30; 20 MG/100ML; MG/100ML; MG/100ML; MG/100ML
30 INJECTION, SOLUTION INTRAVENOUS CONTINUOUS
Status: CANCELLED | OUTPATIENT
Start: 2018-05-24

## 2018-04-26 RX ORDER — PROMETHAZINE HYDROCHLORIDE 25 MG/1
25 SUPPOSITORY RECTAL ONCE AS NEEDED
Status: DISCONTINUED | OUTPATIENT
Start: 2018-04-26 | End: 2018-04-26 | Stop reason: HOSPADM

## 2018-04-26 RX ORDER — SODIUM CHLORIDE, SODIUM LACTATE, POTASSIUM CHLORIDE, CALCIUM CHLORIDE 600; 310; 30; 20 MG/100ML; MG/100ML; MG/100ML; MG/100ML
30 INJECTION, SOLUTION INTRAVENOUS CONTINUOUS PRN
Status: DISCONTINUED | OUTPATIENT
Start: 2018-04-26 | End: 2018-04-26 | Stop reason: HOSPADM

## 2018-04-26 RX ORDER — PROMETHAZINE HYDROCHLORIDE 25 MG/1
25 TABLET ORAL ONCE AS NEEDED
Status: DISCONTINUED | OUTPATIENT
Start: 2018-04-26 | End: 2018-04-26 | Stop reason: HOSPADM

## 2018-04-26 RX ORDER — LIDOCAINE HYDROCHLORIDE 20 MG/ML
INJECTION, SOLUTION INFILTRATION; PERINEURAL AS NEEDED
Status: DISCONTINUED | OUTPATIENT
Start: 2018-04-26 | End: 2018-04-26

## 2018-04-26 RX ORDER — SODIUM CHLORIDE, SODIUM LACTATE, POTASSIUM CHLORIDE, CALCIUM CHLORIDE 600; 310; 30; 20 MG/100ML; MG/100ML; MG/100ML; MG/100ML
30 INJECTION, SOLUTION INTRAVENOUS CONTINUOUS
Status: DISCONTINUED | OUTPATIENT
Start: 2018-04-26 | End: 2018-04-26 | Stop reason: HOSPADM

## 2018-04-26 RX ADMIN — PROPOFOL 200 MCG/KG/MIN: 10 INJECTION, EMULSION INTRAVENOUS at 10:22

## 2018-04-26 RX ADMIN — SODIUM CHLORIDE, POTASSIUM CHLORIDE, SODIUM LACTATE AND CALCIUM CHLORIDE: 600; 310; 30; 20 INJECTION, SOLUTION INTRAVENOUS at 10:20

## 2018-04-26 NOTE — TELEPHONE ENCOUNTER
----- Message from Alee Ludwig sent at 4/26/2018  1:27 PM EDT -----  Regarding: PT declined   spoke with pt he declined to schedule at this time states he can not just get off work any time he said he also needs to check with his insurance and if/when he is ready to schedule he will call us./mailed scheduling reminder card per pt request.

## 2018-04-26 NOTE — ANESTHESIA PREPROCEDURE EVALUATION
Anesthesia Evaluation     NPO Solid Status: > 8 hours             Airway   Mallampati: II  TM distance: >3 FB  Neck ROM: full  Dental - normal exam     Pulmonary - normal exam   (+) a smoker Current Smoked day of surgery, sleep apnea,   Cardiovascular - normal exam    (+) hypertension, hyperlipidemia,       Neuro/Psych  GI/Hepatic/Renal/Endo    (+) obesity,       Musculoskeletal     Abdominal    Substance History      OB/GYN          Other                        Anesthesia Plan    ASA 3     MAC     Anesthetic plan and risks discussed with patient.

## 2018-04-27 LAB
CYTO UR: NORMAL
LAB AP CASE REPORT: NORMAL
Lab: NORMAL
PATH REPORT.FINAL DX SPEC: NORMAL
PATH REPORT.GROSS SPEC: NORMAL

## 2018-05-15 NOTE — PROGRESS NOTES
Focal non-specific inflammation  I saw pts note regarding not being able to reschedule colonoscopy at this time.   Follow up when he can

## 2018-05-24 ENCOUNTER — ANESTHESIA (OUTPATIENT)
Dept: GASTROENTEROLOGY | Facility: HOSPITAL | Age: 53
End: 2018-05-24

## 2018-05-24 ENCOUNTER — ANESTHESIA EVENT (OUTPATIENT)
Dept: GASTROENTEROLOGY | Facility: HOSPITAL | Age: 53
End: 2018-05-24

## 2018-05-24 ENCOUNTER — HOSPITAL ENCOUNTER (OUTPATIENT)
Facility: HOSPITAL | Age: 53
Setting detail: HOSPITAL OUTPATIENT SURGERY
Discharge: HOME | End: 2018-05-24
Attending: INTERNAL MEDICINE | Admitting: INTERNAL MEDICINE

## 2018-05-24 VITALS
RESPIRATION RATE: 16 BRPM | HEART RATE: 78 BPM | WEIGHT: 289 LBS | BODY MASS INDEX: 37.09 KG/M2 | OXYGEN SATURATION: 98 % | DIASTOLIC BLOOD PRESSURE: 78 MMHG | SYSTOLIC BLOOD PRESSURE: 126 MMHG | TEMPERATURE: 97.7 F | HEIGHT: 74 IN

## 2018-05-24 DIAGNOSIS — R19.5 POSITIVE COLORECTAL CANCER SCREENING USING DNA-BASED STOOL TEST: ICD-10-CM

## 2018-05-24 PROCEDURE — 45385 COLONOSCOPY W/LESION REMOVAL: CPT | Performed by: INTERNAL MEDICINE

## 2018-05-24 PROCEDURE — 88305 TISSUE EXAM BY PATHOLOGIST: CPT | Performed by: INTERNAL MEDICINE

## 2018-05-24 PROCEDURE — 45380 COLONOSCOPY AND BIOPSY: CPT | Performed by: INTERNAL MEDICINE

## 2018-05-24 PROCEDURE — 25010000002 PROPOFOL 10 MG/ML EMULSION: Performed by: ANESTHESIOLOGY

## 2018-05-24 PROCEDURE — 45381 COLONOSCOPY SUBMUCOUS NJX: CPT | Performed by: INTERNAL MEDICINE

## 2018-05-24 DEVICE — DEV CLIP ENDO RESOLUTION360 CONTRL ROT 235CM: Type: IMPLANTABLE DEVICE | Site: ASCENDING COLON | Status: FUNCTIONAL

## 2018-05-24 RX ORDER — LIDOCAINE HYDROCHLORIDE 20 MG/ML
INJECTION, SOLUTION INFILTRATION; PERINEURAL AS NEEDED
Status: DISCONTINUED | OUTPATIENT
Start: 2018-05-24 | End: 2018-05-24 | Stop reason: SURG

## 2018-05-24 RX ORDER — SODIUM CHLORIDE, SODIUM LACTATE, POTASSIUM CHLORIDE, CALCIUM CHLORIDE 600; 310; 30; 20 MG/100ML; MG/100ML; MG/100ML; MG/100ML
30 INJECTION, SOLUTION INTRAVENOUS CONTINUOUS
Status: DISCONTINUED | OUTPATIENT
Start: 2018-05-24 | End: 2018-05-24 | Stop reason: HOSPADM

## 2018-05-24 RX ORDER — PROPOFOL 10 MG/ML
VIAL (ML) INTRAVENOUS CONTINUOUS PRN
Status: DISCONTINUED | OUTPATIENT
Start: 2018-05-24 | End: 2018-05-24 | Stop reason: SURG

## 2018-05-24 RX ORDER — PROPOFOL 10 MG/ML
VIAL (ML) INTRAVENOUS AS NEEDED
Status: DISCONTINUED | OUTPATIENT
Start: 2018-05-24 | End: 2018-05-24 | Stop reason: SURG

## 2018-05-24 RX ADMIN — PROPOFOL 50 MCG/KG/MIN: 10 INJECTION, EMULSION INTRAVENOUS at 08:45

## 2018-05-24 RX ADMIN — SODIUM CHLORIDE, POTASSIUM CHLORIDE, SODIUM LACTATE AND CALCIUM CHLORIDE 30 ML/HR: 600; 310; 30; 20 INJECTION, SOLUTION INTRAVENOUS at 08:39

## 2018-05-24 RX ADMIN — PROPOFOL 100 MG: 10 INJECTION, EMULSION INTRAVENOUS at 09:15

## 2018-05-24 RX ADMIN — PROPOFOL 30 MG: 10 INJECTION, EMULSION INTRAVENOUS at 09:35

## 2018-05-24 RX ADMIN — PROPOFOL 100 MG: 10 INJECTION, EMULSION INTRAVENOUS at 08:45

## 2018-05-24 RX ADMIN — PROPOFOL 50 MG: 10 INJECTION, EMULSION INTRAVENOUS at 09:25

## 2018-05-24 RX ADMIN — LIDOCAINE HYDROCHLORIDE 60 MG: 20 INJECTION, SOLUTION INFILTRATION; PERINEURAL at 08:45

## 2018-05-24 NOTE — ANESTHESIA PREPROCEDURE EVALUATION
Anesthesia Evaluation     Patient summary reviewed and Nursing notes reviewed   NPO Solid Status: > 8 hours  NPO Liquid Status: > 4 hours           Airway   Mallampati: I  TM distance: <3 FB  Neck ROM: full  no difficulty expected  Dental - normal exam     Pulmonary - normal exam   (+) a smoker Current Smoked day of surgery, sleep apnea,   Cardiovascular - normal exam  Exercise tolerance: good (4-7 METS)    (+) hypertension, hyperlipidemia,       Neuro/Psych- negative ROS  GI/Hepatic/Renal/Endo    (+) obesity,     (-) morbid obesity    Musculoskeletal (-) negative ROS    Abdominal  - normal exam    Bowel sounds: normal.   Substance History - negative use     OB/GYN negative ob/gyn ROS         Other                        Anesthesia Plan    ASA 3     MAC     Anesthetic plan and risks discussed with patient.

## 2018-05-24 NOTE — INTERVAL H&P NOTE
H&P as per above, with following addition.    Pt with inadequate prep last visit - here for repeat colonoscopy due to + cologuard

## 2018-05-24 NOTE — H&P (VIEW-ONLY)
"[facility]  Pre Procedure History & Physical    Chief Complaint:   Time for my colonoscopy    Subjective     HPI:   52 y.o. male presenting for evaluation + cologard.  This was done for routine colon cancer screening. Pt denies any GI symptoms.  Reports father with hx of colon polyps at age 65.    Past Medical History:   Past Medical History:   Diagnosis Date   • Hyperlipidemia    • Hypertension    • Occult blood in stools    • Sleep apnea     CPAP       Family History:  Family History   Problem Relation Age of Onset   • Lung cancer Father    • Kidney disease Father    • Kidney disease Brother    • Malig Hyperthermia Neg Hx        Social History:   reports that he has been smoking Cigarettes.  He has a 30.00 pack-year smoking history. He has never used smokeless tobacco. He reports that he drinks alcohol. He reports that he does not use drugs.    Medications:   Prescriptions Prior to Admission   Medication Sig Dispense Refill Last Dose   • Doxylamine Succinate, Sleep, (SLEEP AID PO) Take  by mouth.   4/24/2018   • lisinopril (PRINIVIL,ZESTRIL) 10 MG tablet Take 1 tablet by mouth Daily. 90 tablet 1 4/25/2018 at Unknown time   • melatonin 1 MG tablet Take 10 mg by mouth Every Night.   4/23/2018   • rosuvastatin (CRESTOR) 5 MG tablet Take 1 tablet by mouth Daily. 90 tablet 1 4/25/2018 at Unknown time   • ibuprofen (ADVIL,MOTRIN) 200 MG tablet Take 200 mg by mouth Every 6 (Six) Hours As Needed for mild pain (1-3).   More than a month at Unknown time       Allergies:  Review of patient's allergies indicates no known allergies.    ROS:    Pertinent items are noted in HPI     Objective     Blood pressure 128/71, pulse 81, temperature 98.3 °F (36.8 °C), temperature source Oral, resp. rate 16, height 188 cm (74\"), weight 132 kg (292 lb), SpO2 96 %.    Physical Exam   Constitutional: Pt is oriented to person, place, and time and well-developed, well-nourished, and in no distress.   HENT: "   Mouth/Throat: Oropharynx is clear and moist.   Neck: Normal range of motion. Neck supple.   Cardiovascular: Normal rate, regular rhythm and normal heart sounds.    Pulmonary/Chest: Effort normal and breath sounds normal. No respiratory distress. No  wheezes.   Abdominal: Soft. Bowel sounds are normal.   Skin: Skin is warm and dry.   Psychiatric: Mood, memory, affect and judgment normal.     Assessment/Plan     Diagnosis:  Positive cologard test    Anticipated Surgical Procedure:  Colonoscopy    The risks, benefits, and alternatives of this procedure have been discussed with the patient or the responsible party- the patient understands and agrees to proceed.

## 2018-05-24 NOTE — DISCHARGE INSTRUCTIONS
+++++++++++++++++++++++++++++++++++++++++++++++++++++++++++++++++++++++++++++++++++++++++++++++++++++    For the next 24 hours patient needs to be with a responsible adult.    For 24 hours DO NOT drive, operate machinery, appliances, drink alcohol, make important decisions or sign legal documents.    Start with a light or bland diet and advance to regular diet as tolerated.    Follow recommendations on procedure report provided by your doctor.    Call Dr Lopez for problems .    Problems may include but not limited to: large amounts of bleeding, trouble breathing, repeated vomiting, severe unrelieved pain, fever or chills.    +++++++++++++++++++++++++++++++++++++++++++++++++++++++++++++++++++++++++++++++++++++++++++++++++++++++

## 2018-05-24 NOTE — ANESTHESIA POSTPROCEDURE EVALUATION
"Patient: LALO Morris    Procedure Summary     Date:  05/24/18 Room / Location:  [facility] ENDOSCOPY 10 / [facility] ENDOSCOPY    Anesthesia Start:  0846 Anesthesia Stop:  0951    Procedure:  COLONOSCOPY into cecum with saline lift, hot snare polypectomy, clip (N/A ) Diagnosis:       Positive colorectal cancer screening using DNA-based stool test      (Positive colorectal cancer screening using DNA-based stool test [R19.5])    Surgeon:  Robles Lopez MD Provider:  Malik Flood MD    Anesthesia Type:  MAC ASA Status:  3          Anesthesia Type: MAC  Last vitals  BP   122/78 (05/24/18 0956)   Temp   36.5 °C (97.7 °F) (05/24/18 1002)   Pulse   67 (05/24/18 0956)   Resp   16 (05/24/18 0956)     SpO2   96 % (05/24/18 0956)     Post Anesthesia Care and Evaluation    Patient location during evaluation: bedside  Patient participation: complete - patient participated  Level of consciousness: awake and alert  Pain management: adequate  Airway patency: patent  Anesthetic complications: No anesthetic complications    Cardiovascular status: acceptable  Respiratory status: acceptable  Hydration status: acceptable    Comments: /78 (BP Location: Left arm, Patient Position: Lying)   Pulse 67   Temp 36.5 °C (97.7 °F) (Oral)   Resp 16   Ht 188 cm (74\")   Wt 131 kg (289 lb)   SpO2 96%   BMI 37.11 kg/m²       "

## 2018-05-25 LAB
CYTO UR: NORMAL
LAB AP CASE REPORT: NORMAL
LAB AP CLINICAL INFORMATION: NORMAL
Lab: NORMAL
PATH REPORT.FINAL DX SPEC: NORMAL
PATH REPORT.GROSS SPEC: NORMAL

## 2018-05-29 DIAGNOSIS — I10 ESSENTIAL HYPERTENSION: ICD-10-CM

## 2018-05-29 DIAGNOSIS — E78.5 HYPERLIPIDEMIA, UNSPECIFIED HYPERLIPIDEMIA TYPE: ICD-10-CM

## 2018-05-29 RX ORDER — LISINOPRIL 10 MG/1
TABLET ORAL
Qty: 30 TABLET | Refills: 5 | Status: SHIPPED | OUTPATIENT
Start: 2018-05-29 | End: 2018-12-10 | Stop reason: SDUPTHER

## 2018-05-29 RX ORDER — ROSUVASTATIN CALCIUM 5 MG/1
TABLET, COATED ORAL
Qty: 30 TABLET | Refills: 5 | Status: SHIPPED | OUTPATIENT
Start: 2018-05-29 | End: 2018-12-10 | Stop reason: SDUPTHER

## 2018-06-15 ENCOUNTER — TELEPHONE (OUTPATIENT)
Dept: GASTROENTEROLOGY | Facility: CLINIC | Age: 53
End: 2018-06-15

## 2018-06-15 NOTE — TELEPHONE ENCOUNTER
Notes recorded by Robles Lopez MD on 5/31/2018 at 11:59 AM EDT  Mix of TAs and hyperplastic polyps  Recall 2 years

## 2018-06-15 NOTE — TELEPHONE ENCOUNTER
Patient called, advised as per Dr. Lopez's note. He verb understanding, patient's health maintenance record updated to reflect the need to repeat colonoscopy in 2 years.

## 2018-06-15 NOTE — TELEPHONE ENCOUNTER
----- Message from Tyler iPmentel sent at 6/15/2018  9:19 AM EDT -----  Regarding: PATH REPORT   Contact: [phone]  PT CALLED FOR C/S REPORT

## 2018-12-10 DIAGNOSIS — I10 ESSENTIAL HYPERTENSION: ICD-10-CM

## 2018-12-10 DIAGNOSIS — E78.5 HYPERLIPIDEMIA, UNSPECIFIED HYPERLIPIDEMIA TYPE: ICD-10-CM

## 2018-12-10 RX ORDER — ROSUVASTATIN CALCIUM 5 MG/1
TABLET, COATED ORAL
Qty: 30 TABLET | Refills: 4 | Status: SHIPPED | OUTPATIENT
Start: 2018-12-10 | End: 2019-05-20 | Stop reason: SDUPTHER

## 2018-12-10 RX ORDER — LISINOPRIL 10 MG/1
TABLET ORAL
Qty: 30 TABLET | Refills: 4 | Status: SHIPPED | OUTPATIENT
Start: 2018-12-10 | End: 2019-05-20 | Stop reason: SDUPTHER

## 2019-01-09 DIAGNOSIS — R73.09 ELEVATED GLUCOSE LEVEL: ICD-10-CM

## 2019-01-09 DIAGNOSIS — R19.5 OCCULT BLOOD IN STOOLS: ICD-10-CM

## 2019-01-09 DIAGNOSIS — E78.5 HYPERLIPIDEMIA, UNSPECIFIED HYPERLIPIDEMIA TYPE: Primary | ICD-10-CM

## 2019-01-13 ENCOUNTER — RESULTS ENCOUNTER (OUTPATIENT)
Dept: FAMILY MEDICINE CLINIC | Facility: CLINIC | Age: 54
End: 2019-01-13

## 2019-01-13 DIAGNOSIS — R19.5 OCCULT BLOOD IN STOOLS: ICD-10-CM

## 2019-01-13 DIAGNOSIS — E78.5 HYPERLIPIDEMIA, UNSPECIFIED HYPERLIPIDEMIA TYPE: ICD-10-CM

## 2019-01-13 DIAGNOSIS — R73.09 ELEVATED GLUCOSE LEVEL: ICD-10-CM

## 2019-01-29 ENCOUNTER — OFFICE VISIT (OUTPATIENT)
Dept: FAMILY MEDICINE CLINIC | Facility: CLINIC | Age: 54
End: 2019-01-29

## 2019-01-29 VITALS
WEIGHT: 294 LBS | OXYGEN SATURATION: 94 % | DIASTOLIC BLOOD PRESSURE: 66 MMHG | HEIGHT: 74 IN | BODY MASS INDEX: 37.73 KG/M2 | TEMPERATURE: 98.6 F | SYSTOLIC BLOOD PRESSURE: 118 MMHG | RESPIRATION RATE: 18 BRPM | HEART RATE: 100 BPM

## 2019-01-29 DIAGNOSIS — I10 ESSENTIAL HYPERTENSION: ICD-10-CM

## 2019-01-29 DIAGNOSIS — G47.09 OTHER INSOMNIA: Primary | ICD-10-CM

## 2019-01-29 DIAGNOSIS — E78.5 HYPERLIPIDEMIA, UNSPECIFIED HYPERLIPIDEMIA TYPE: ICD-10-CM

## 2019-01-29 DIAGNOSIS — Z00.00 ANNUAL PHYSICAL EXAM: ICD-10-CM

## 2019-01-29 PROCEDURE — 99396 PREV VISIT EST AGE 40-64: CPT | Performed by: NURSE PRACTITIONER

## 2019-01-29 PROCEDURE — 99213 OFFICE O/P EST LOW 20 MIN: CPT | Performed by: NURSE PRACTITIONER

## 2019-01-29 RX ORDER — TRAZODONE HYDROCHLORIDE 50 MG/1
50 TABLET ORAL NIGHTLY
Qty: 60 TABLET | Refills: 5 | Status: SHIPPED | OUTPATIENT
Start: 2019-01-29 | End: 2020-02-10 | Stop reason: SINTOL

## 2019-01-29 RX ORDER — ACETAMINOPHEN AND CODEINE PHOSPHATE 300; 30 MG/1; MG/1
1 TABLET ORAL EVERY 4 HOURS PRN
COMMUNITY
End: 2020-02-10

## 2019-01-29 NOTE — PROGRESS NOTES
Subjective   R Janak Morris is a 53 y.o. male.     Chief Complaint   Patient presents with   • Annual Exam     Mr. Morris presents today for his annual physical exam. He reports that he fell last week and sprained his LCL on his left knee. He reports that his knee is still painful but that he is recovering. He reports that the OTC sleeping aids he is taking no long his helping him. 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 He states he may sleep for an hour or two and then he is awake again. He would like to try a prescription sleep aid.      I have reviewed the patient's medical history in detail and updated the computerized patient record.    The following portions of the patient's history were reviewed and updated as appropriate: allergies, current medications, past family history, past medical history, past social history, past surgical history and problem list.     Current Outpatient Medications on File Prior to Visit   Medication Sig   • acetaminophen-codeine (TYLENOL #3) 300-30 MG per tablet Take 1 tablet by mouth Every 4 (Four) Hours As Needed for Moderate Pain .   • Doxylamine Succinate, Sleep, (SLEEP AID PO) Take 1 tablet by mouth Every Night.   • lisinopril (PRINIVIL,ZESTRIL) 10 MG tablet TAKE ONE TABLET BY MOUTH DAILY   • melatonin 1 MG tablet Take 10 mg by mouth Every Night.   • rosuvastatin (CRESTOR) 5 MG tablet TAKE ONE TABLET BY MOUTH DAILY     No current facility-administered medications on file prior to visit.        Review of Systems   Constitutional: Negative.    Respiratory: Negative.    Cardiovascular: Negative.    Gastrointestinal: Negative.    Musculoskeletal: Negative.    Skin: Negative.    Neurological: Negative.    Psychiatric/Behavioral: Positive for sleep disturbance.   All other systems reviewed and are negative.      Objective    Vitals:    01/29/19 0923   BP: 118/66   Pulse: 100   Resp: 18   Temp: 98.6 °F (37 °C)    SpO2: 94%     Physical Exam   Constitutional: He is oriented to person, place, and time. He appears well-developed and well-nourished.   HENT:   Right Ear: External ear normal.   Left Ear: External ear normal.   Mouth/Throat: Oropharynx is clear and moist.   Neck: Normal range of motion. No JVD present. No tracheal deviation present. No thyromegaly present.   Cardiovascular: Normal rate, regular rhythm and normal heart sounds.   Pulmonary/Chest: Effort normal and breath sounds normal.   Musculoskeletal: Normal range of motion. He exhibits no edema.   Lymphadenopathy:     He has no cervical adenopathy.   Neurological: He is alert and oriented to person, place, and time.   Skin: Skin is warm and dry.   Psychiatric:   No acute distress   Vitals reviewed.        Assessment/Plan   LALO Briceno was seen today for annual exam.    Diagnoses and all orders for this visit:    Other insomnia  -     traZODone (DESYREL) 50 MG tablet; Take 1 tablet by mouth Every Night. May repeat in 1 hour as needed    Essential hypertension  -     CBC (No Diff)  -     Comprehensive Metabolic Panel    Hyperlipidemia, unspecified hyperlipidemia type  -     Lipid Panel With / Chol / HDL Ratio    Annual physical exam    1. His physical exam is within normal limits today.   2. We discussed that he is up to day with preventive procedures. He has declined having an influenza vaccination today, states he never gets one.  3. We discussed age appropriated healthy behaviors such as weight loss, exercise, wearing a seat belt, not drinking or texting while driving.  4. He is to have fasting labs done today and I will call him with the results.  5. He is to start Trazodone 50 mg daily for sleep and may repeat in 1 hour as needed .   6. He is to follow up as needed and in 1 year for his next annual exam.

## 2019-01-30 LAB
ALBUMIN SERPL-MCNC: 4.8 G/DL (ref 3.5–5.5)
ALBUMIN/GLOB SERPL: 1.9 {RATIO} (ref 1.2–2.2)
ALP SERPL-CCNC: 67 IU/L (ref 39–117)
ALT SERPL-CCNC: 33 IU/L (ref 0–44)
AST SERPL-CCNC: 21 IU/L (ref 0–40)
BILIRUB SERPL-MCNC: 0.8 MG/DL (ref 0–1.2)
BUN SERPL-MCNC: 10 MG/DL (ref 6–24)
BUN/CREAT SERPL: 11 (ref 9–20)
CALCIUM SERPL-MCNC: 9.6 MG/DL (ref 8.7–10.2)
CHLORIDE SERPL-SCNC: 104 MMOL/L (ref 96–106)
CHOLEST SERPL-MCNC: 146 MG/DL (ref 100–199)
CHOLEST/HDLC SERPL: 5 RATIO (ref 0–5)
CO2 SERPL-SCNC: 20 MMOL/L (ref 20–29)
CREAT SERPL-MCNC: 0.88 MG/DL (ref 0.76–1.27)
ERYTHROCYTE [DISTWIDTH] IN BLOOD BY AUTOMATED COUNT: 13.3 % (ref 12.3–15.4)
GLOBULIN SER CALC-MCNC: 2.5 G/DL (ref 1.5–4.5)
GLUCOSE SERPL-MCNC: 110 MG/DL (ref 65–99)
HCT VFR BLD AUTO: 46.7 % (ref 37.5–51)
HDLC SERPL-MCNC: 29 MG/DL
HGB BLD-MCNC: 16.4 G/DL (ref 13–17.7)
LDLC SERPL CALC-MCNC: 90 MG/DL (ref 0–99)
MCH RBC QN AUTO: 32.3 PG (ref 26.6–33)
MCHC RBC AUTO-ENTMCNC: 35.1 G/DL (ref 31.5–35.7)
MCV RBC AUTO: 92 FL (ref 79–97)
PLATELET # BLD AUTO: 267 X10E3/UL (ref 150–379)
POTASSIUM SERPL-SCNC: 4.8 MMOL/L (ref 3.5–5.2)
PROT SERPL-MCNC: 7.3 G/DL (ref 6–8.5)
RBC # BLD AUTO: 5.07 X10E6/UL (ref 4.14–5.8)
SODIUM SERPL-SCNC: 141 MMOL/L (ref 134–144)
TRIGL SERPL-MCNC: 137 MG/DL (ref 0–149)
VLDLC SERPL CALC-MCNC: 27 MG/DL (ref 5–40)
WBC # BLD AUTO: 8.7 X10E3/UL (ref 3.4–10.8)

## 2019-01-30 NOTE — PROGRESS NOTES
Please call this patient and let him know his labs are normal. His fasting glucose levels are elevated and have been over the past 2 years. I would like him to stop by the office to have an A1C done. Nurse visit.

## 2019-05-20 DIAGNOSIS — E78.5 HYPERLIPIDEMIA, UNSPECIFIED HYPERLIPIDEMIA TYPE: ICD-10-CM

## 2019-05-20 DIAGNOSIS — I10 ESSENTIAL HYPERTENSION: ICD-10-CM

## 2019-05-20 RX ORDER — LISINOPRIL 10 MG/1
TABLET ORAL
Qty: 90 TABLET | Refills: 3 | Status: SHIPPED | OUTPATIENT
Start: 2019-05-20 | End: 2020-05-18

## 2019-05-20 RX ORDER — ROSUVASTATIN CALCIUM 5 MG/1
TABLET, COATED ORAL
Qty: 90 TABLET | Refills: 3 | Status: SHIPPED | OUTPATIENT
Start: 2019-05-20 | End: 2020-02-10 | Stop reason: SINTOL

## 2020-01-28 DIAGNOSIS — Z12.5 SCREENING PSA (PROSTATE SPECIFIC ANTIGEN): ICD-10-CM

## 2020-01-28 DIAGNOSIS — E11.9 TYPE 2 DIABETES MELLITUS WITHOUT COMPLICATION, WITHOUT LONG-TERM CURRENT USE OF INSULIN (HCC): ICD-10-CM

## 2020-01-28 DIAGNOSIS — Z00.00 ROUTINE GENERAL MEDICAL EXAMINATION AT A HEALTH CARE FACILITY: Primary | ICD-10-CM

## 2020-02-01 ENCOUNTER — RESULTS ENCOUNTER (OUTPATIENT)
Dept: FAMILY MEDICINE CLINIC | Facility: CLINIC | Age: 55
End: 2020-02-01

## 2020-02-01 DIAGNOSIS — E11.9 TYPE 2 DIABETES MELLITUS WITHOUT COMPLICATION, WITHOUT LONG-TERM CURRENT USE OF INSULIN (HCC): ICD-10-CM

## 2020-02-01 DIAGNOSIS — Z00.00 ROUTINE GENERAL MEDICAL EXAMINATION AT A HEALTH CARE FACILITY: ICD-10-CM

## 2020-02-01 DIAGNOSIS — Z12.5 SCREENING PSA (PROSTATE SPECIFIC ANTIGEN): ICD-10-CM

## 2020-02-04 LAB
ALBUMIN SERPL-MCNC: 4.8 G/DL (ref 3.5–5.2)
ALBUMIN/CREAT UR: <3 MG/G CREAT (ref 0–29)
ALBUMIN/GLOB SERPL: 2.3 G/DL
ALP SERPL-CCNC: 63 U/L (ref 39–117)
ALT SERPL-CCNC: 37 U/L (ref 1–41)
AST SERPL-CCNC: 17 U/L (ref 1–40)
BILIRUB SERPL-MCNC: 0.6 MG/DL (ref 0.2–1.2)
BUN SERPL-MCNC: 17 MG/DL (ref 6–20)
BUN/CREAT SERPL: 21.8 (ref 7–25)
CALCIUM SERPL-MCNC: 9.4 MG/DL (ref 8.6–10.5)
CHLORIDE SERPL-SCNC: 101 MMOL/L (ref 98–107)
CHOLEST SERPL-MCNC: 185 MG/DL (ref 0–200)
CO2 SERPL-SCNC: 20.1 MMOL/L (ref 22–29)
CREAT SERPL-MCNC: 0.78 MG/DL (ref 0.76–1.27)
CREAT UR-MCNC: 97.6 MG/DL
ERYTHROCYTE [DISTWIDTH] IN BLOOD BY AUTOMATED COUNT: 12.5 % (ref 12.3–15.4)
GLOBULIN SER CALC-MCNC: 2.1 GM/DL
GLUCOSE SERPL-MCNC: 91 MG/DL (ref 65–99)
HBA1C MFR BLD: 5.7 % (ref 4.8–5.6)
HCT VFR BLD AUTO: 43.7 % (ref 37.5–51)
HDLC SERPL-MCNC: 31 MG/DL (ref 40–60)
HGB BLD-MCNC: 15 G/DL (ref 13–17.7)
LDLC SERPL CALC-MCNC: 128 MG/DL (ref 0–100)
MCH RBC QN AUTO: 31.5 PG (ref 26.6–33)
MCHC RBC AUTO-ENTMCNC: 34.3 G/DL (ref 31.5–35.7)
MCV RBC AUTO: 91.8 FL (ref 79–97)
MICROALBUMIN UR-MCNC: <3 UG/ML
PLATELET # BLD AUTO: 226 10*3/MM3 (ref 140–450)
POTASSIUM SERPL-SCNC: 4.4 MMOL/L (ref 3.5–5.2)
PROT SERPL-MCNC: 6.9 G/DL (ref 6–8.5)
PSA SERPL-MCNC: 0.3 NG/ML (ref 0–4)
RBC # BLD AUTO: 4.76 10*6/MM3 (ref 4.14–5.8)
SODIUM SERPL-SCNC: 136 MMOL/L (ref 136–145)
TRIGL SERPL-MCNC: 129 MG/DL (ref 0–150)
VLDLC SERPL CALC-MCNC: 25.8 MG/DL
WBC # BLD AUTO: 6.19 10*3/MM3 (ref 3.4–10.8)

## 2020-02-10 ENCOUNTER — OFFICE VISIT (OUTPATIENT)
Dept: FAMILY MEDICINE CLINIC | Facility: CLINIC | Age: 55
End: 2020-02-10

## 2020-02-10 VITALS
HEART RATE: 76 BPM | TEMPERATURE: 98 F | WEIGHT: 285 LBS | BODY MASS INDEX: 36.57 KG/M2 | HEIGHT: 74 IN | RESPIRATION RATE: 18 BRPM | DIASTOLIC BLOOD PRESSURE: 70 MMHG | SYSTOLIC BLOOD PRESSURE: 116 MMHG | OXYGEN SATURATION: 96 %

## 2020-02-10 DIAGNOSIS — Z00.00 ANNUAL PHYSICAL EXAM: Primary | ICD-10-CM

## 2020-02-10 PROCEDURE — 99396 PREV VISIT EST AGE 40-64: CPT | Performed by: NURSE PRACTITIONER

## 2020-02-10 NOTE — PROGRESS NOTES
Subjective   R Janak Morris is a 54 y.o. male.     Chief Complaint   Patient presents with   • Annual Exam     Mr. Morris presents today for his annual physical exam with lab review. He has stopped taking his Trazodone and Crestor because he felt the medications where causing him to have memory issues. He reports being off of the medications for more than a month. He also reports he started the KETO diet last month and has lost at least 10 lbs. He voices no concerns today.      I have reviewed the patient's medical history in detail and updated the computerized patient record.    The following portions of the patient's history were reviewed and updated as appropriate: allergies, current medications, past family history, past medical history, past social history, past surgical history and problem list.    Review of Systems   Constitutional: Negative.    HENT: Negative.    Respiratory: Negative.    Cardiovascular: Negative.    Gastrointestinal: Negative.    Musculoskeletal: Positive for arthralgias (of knees and feet).   Neurological: Negative.    All other systems reviewed and are negative.      Objective    Vitals:    02/10/20 0919   BP: 116/70   Pulse: 76   Resp: 18   Temp: 98 °F (36.7 °C)   SpO2: 96%     Body mass index is 36.59 kg/m².     Physical Exam   Constitutional: He is oriented to person, place, and time. He appears well-developed and well-nourished.   HENT:   Right Ear: Tympanic membrane normal.   Left Ear: Tympanic membrane normal.   Mouth/Throat: Uvula is midline and oropharynx is clear and moist.   Neck: Normal range of motion. No thyromegaly present.   Cardiovascular: Normal rate, regular rhythm, normal heart sounds and intact distal pulses.   Pulmonary/Chest: Effort normal and breath sounds normal.   Abdominal: Soft. Bowel sounds are normal.   Lymphadenopathy:     He has no cervical adenopathy.   Neurological: He is alert and oriented to person, place, and time.   Skin: Skin is warm and  dry.   Psychiatric:   No acute distress   Vitals reviewed.        Assessment/Plan   LALO Briceno was seen today for annual exam.    Diagnoses and all orders for this visit:    Annual physical exam      Mr. Morris's physical assessment is within normal parameters today.   I have reviewed his labs with him today. All of his labs are essentially normal. His lipids have increased some and so did his A1C.   We discussed that he needs to continue to work at loosing weight. He can continue the KETO diet. He needs to start exercising at least 3 days a week for 30 minutes a day to assist with his weight loss efforts.   He is to return in 6 months to follow up on his lipid control.

## 2020-05-18 DIAGNOSIS — I10 ESSENTIAL HYPERTENSION: ICD-10-CM

## 2020-05-18 RX ORDER — LISINOPRIL 10 MG/1
TABLET ORAL
Qty: 30 TABLET | Refills: 3 | Status: SHIPPED | OUTPATIENT
Start: 2020-05-18 | End: 2020-09-29

## 2020-08-06 DIAGNOSIS — E78.2 MIXED HYPERLIPIDEMIA: Primary | ICD-10-CM

## 2020-08-09 ENCOUNTER — RESULTS ENCOUNTER (OUTPATIENT)
Dept: FAMILY MEDICINE CLINIC | Facility: CLINIC | Age: 55
End: 2020-08-09

## 2020-08-09 DIAGNOSIS — E78.2 MIXED HYPERLIPIDEMIA: ICD-10-CM

## 2020-08-12 LAB
CHOLEST SERPL-MCNC: 188 MG/DL (ref 0–200)
CHOLEST/HDLC SERPL: 5.7 {RATIO}
HDLC SERPL-MCNC: 33 MG/DL (ref 40–60)
LDLC SERPL CALC-MCNC: 128 MG/DL (ref 0–100)
TRIGL SERPL-MCNC: 135 MG/DL (ref 0–150)
VLDLC SERPL CALC-MCNC: 27 MG/DL

## 2020-08-19 ENCOUNTER — OFFICE VISIT (OUTPATIENT)
Dept: FAMILY MEDICINE CLINIC | Facility: CLINIC | Age: 55
End: 2020-08-19

## 2020-08-19 VITALS
BODY MASS INDEX: 33.62 KG/M2 | HEART RATE: 73 BPM | OXYGEN SATURATION: 97 % | TEMPERATURE: 97.1 F | HEIGHT: 74 IN | DIASTOLIC BLOOD PRESSURE: 72 MMHG | SYSTOLIC BLOOD PRESSURE: 128 MMHG | WEIGHT: 262 LBS

## 2020-08-19 DIAGNOSIS — E78.2 MIXED HYPERLIPIDEMIA: Primary | ICD-10-CM

## 2020-08-19 PROCEDURE — 99213 OFFICE O/P EST LOW 20 MIN: CPT | Performed by: NURSE PRACTITIONER

## 2020-08-19 NOTE — PROGRESS NOTES
Fareed Morris is a 55 y.o. male.     Chief Complaint   Patient presents with   • Hyperlipidemia     Hyperlipidemia   This is a chronic problem. The current episode started more than 1 year ago. The problem is controlled. There are no known factors aggravating his hyperlipidemia. Pertinent negatives include no chest pain, focal weakness, leg pain, myalgias or shortness of breath. Current antihyperlipidemic treatment includes diet change and exercise. The current treatment provides moderate improvement of lipids. There are no compliance problems.  Risk factors for coronary artery disease include male sex and obesity.        I have reviewed the patient's medical history in detail and updated the computerized patient record.    The following portions of the patient's history were reviewed and updated as appropriate: allergies, current medications, past family history, past medical history, past social history, past surgical history and problem list.      Current Outpatient Medications:   •  Doxylamine Succinate, Sleep, (SLEEP AID PO), Take 1 tablet by mouth Every Night., Disp: , Rfl:   •  lisinopril (PRINIVIL,ZESTRIL) 10 MG tablet, TAKE ONE TABLET BY MOUTH DAILY, Disp: 30 tablet, Rfl: 3     Review of Systems   Respiratory: Negative for shortness of breath.    Cardiovascular: Negative for chest pain.   Musculoskeletal: Negative for myalgias.   Neurological: Negative for focal weakness.       Objective    Vitals:    08/19/20 0955   BP: 128/72   Pulse: 73   Temp: 97.1 °F (36.2 °C)   SpO2: 97%     Physical Exam   Constitutional: He is oriented to person, place, and time. He appears well-developed and well-nourished.   Cardiovascular: Normal rate, regular rhythm and normal heart sounds.   Pulmonary/Chest: Effort normal and breath sounds normal.   Musculoskeletal: Normal range of motion. He exhibits no edema.   Neurological: He is alert and oriented to person, place, and time.   Skin: Skin is warm and  dry.   Psychiatric:   No acute distress   Vitals reviewed.        Assessment/Plan   LALO Briceno was seen today for hyperlipidemia.    Diagnoses and all orders for this visit:    Mixed hyperlipidemia    Mr. Morris presents today to follow up on his lipid levels.   I  Have reviewed his lipid levels with him today. His lipid levels have not changed since his last visit six months ago. We discussed that he needs to work as eating a diet lower in fats and carbohydrates. He needs to get regular physical exercise outside of his job.   He is to follow up in 6 months for his annual physical with fasting labs the week before. If his lipid levels do not decrease we will discuss starting a statin.           Patient has been erroneously marked as diabetic. Based on the available clinical information, he does not have diabetes and should therefore be excluded from diabetic health maintenance and quality measures for the remainder of the reporting period.

## 2020-09-27 DIAGNOSIS — I10 ESSENTIAL HYPERTENSION: ICD-10-CM

## 2020-09-29 RX ORDER — LISINOPRIL 10 MG/1
TABLET ORAL
Qty: 30 TABLET | Refills: 5 | Status: SHIPPED | OUTPATIENT
Start: 2020-09-29 | End: 2021-03-30

## 2021-02-08 DIAGNOSIS — Z00.00 ANNUAL PHYSICAL EXAM: Primary | ICD-10-CM

## 2021-02-08 DIAGNOSIS — Z12.5 SCREENING PSA (PROSTATE SPECIFIC ANTIGEN): ICD-10-CM

## 2021-02-08 DIAGNOSIS — Z11.59 ENCOUNTER FOR HEPATITIS C SCREENING TEST FOR LOW RISK PATIENT: ICD-10-CM

## 2021-02-08 DIAGNOSIS — E11.9 TYPE 2 DIABETES MELLITUS WITHOUT COMPLICATION, WITHOUT LONG-TERM CURRENT USE OF INSULIN (HCC): ICD-10-CM

## 2021-03-08 ENCOUNTER — OFFICE VISIT (OUTPATIENT)
Dept: FAMILY MEDICINE CLINIC | Facility: CLINIC | Age: 56
End: 2021-03-08

## 2021-03-08 VITALS
WEIGHT: 280 LBS | DIASTOLIC BLOOD PRESSURE: 82 MMHG | SYSTOLIC BLOOD PRESSURE: 120 MMHG | HEIGHT: 74 IN | HEART RATE: 69 BPM | OXYGEN SATURATION: 98 % | BODY MASS INDEX: 35.94 KG/M2 | TEMPERATURE: 98.4 F

## 2021-03-08 DIAGNOSIS — S46.912A MUSCLE STRAIN OF LEFT SHOULDER, INITIAL ENCOUNTER: Primary | ICD-10-CM

## 2021-03-08 PROCEDURE — 99213 OFFICE O/P EST LOW 20 MIN: CPT | Performed by: NURSE PRACTITIONER

## 2021-03-08 RX ORDER — TIZANIDINE HYDROCHLORIDE 4 MG/1
4 CAPSULE, GELATIN COATED ORAL 3 TIMES DAILY
Qty: 42 CAPSULE | Refills: 0 | Status: SHIPPED | OUTPATIENT
Start: 2021-03-08 | End: 2021-03-22 | Stop reason: SDUPTHER

## 2021-03-08 RX ORDER — TRAMADOL HYDROCHLORIDE 50 MG/1
50 TABLET ORAL EVERY 6 HOURS PRN
Qty: 40 TABLET | Refills: 0 | Status: SHIPPED | OUTPATIENT
Start: 2021-03-08 | End: 2021-03-15 | Stop reason: ALTCHOICE

## 2021-03-08 RX ORDER — PREDNISONE 10 MG/1
TABLET ORAL
Qty: 1 EACH | Refills: 0 | Status: SHIPPED | OUTPATIENT
Start: 2021-03-08 | End: 2021-03-15

## 2021-03-08 NOTE — PROGRESS NOTES
"Chief Complaint  Back Pain (Upper Lt side)    Subjective          R Janak Morris presents to [facility]  Back Pain  This is a chronic problem. The current episode started more than 1 month ago. The problem occurs intermittently. The problem has been waxing and waning since onset. The pain is present in the thoracic spine. The quality of the pain is described as aching, burning, cramping, shooting and stabbing. Radiates to: radiates to left shoulder. The pain is at a severity of 10/10. The pain is severe. The pain is the same all the time. The symptoms are aggravated by sitting, standing, lying down and position. Associated symptoms include numbness (occassional) and tingling (on occassion). Pertinent negatives include no headaches or weakness. He has tried chiropractic manipulation, heat, ice and NSAIDs for the symptoms. The treatment provided mild relief.       I have reviewed the patient's medical history in detail and updated the computerized patient record.    Current Outpatient Medications:   •  Doxylamine Succinate, Sleep, (SLEEP AID PO), Take 1 tablet by mouth Every Night., Disp: , Rfl:   •  lisinopril (PRINIVIL,ZESTRIL) 10 MG tablet, TAKE ONE TABLET BY MOUTH DAILY, Disp: 30 tablet, Rfl: 5  •  predniSONE (DELTASONE) 10 MG (21) dose pack, Use as directed on package, Disp: 1 each, Rfl: 0  •  TiZANidine (ZANAFLEX) 4 MG capsule, Take 1 capsule by mouth 3 (Three) Times a Day for 14 days., Disp: 42 capsule, Rfl: 0  •  traMADol (ULTRAM) 50 MG tablet, Take 1 tablet by mouth Every 6 (Six) Hours As Needed for Severe Pain ., Disp: 40 tablet, Rfl: 0     Objective   Vital Signs:   /82 (BP Location: Right arm, Patient Position: Sitting, Cuff Size: Adult)   Pulse 69   Temp 98.4 °F (36.9 °C) (Infrared)   Ht 188 cm (74\")   Wt 127 kg (280 lb)   SpO2 98%   BMI 35.95 kg/m²     Physical Exam  Vitals reviewed.   Constitutional:       Appearance: Normal appearance. "   Musculoskeletal:      Right shoulder: Normal.      Left shoulder: Swelling and tenderness present. Normal range of motion. Normal strength.   Skin:     General: Skin is warm and dry.   Neurological:      Mental Status: He is alert and oriented to person, place, and time.        Result Review :                 Assessment and Plan {CC Problem List  Visit Diagnosis  ROS  Review (Popup)  Health Maintenance  Quality  BestPractice  Medications  SmartSets  SnapShot Encounters  Media :23}   Diagnoses and all orders for this visit:    1. Muscle strain of left shoulder, initial encounter (Primary)  -     predniSONE (DELTASONE) 10 MG (21) dose pack; Use as directed on package  Dispense: 1 each; Refill: 0  -     TiZANidine (ZANAFLEX) 4 MG capsule; Take 1 capsule by mouth 3 (Three) Times a Day for 14 days.  Dispense: 42 capsule; Refill: 0  -     traMADol (ULTRAM) 50 MG tablet; Take 1 tablet by mouth Every 6 (Six) Hours As Needed for Severe Pain .  Dispense: 40 tablet; Refill: 0    Mr. Morris presents with muscle strain above his left scapula. He is to start taking prednisone 10 mg dose pack as instructed, Tizanidine 4 mg three times a day as needed and Tramadol 50 mg every 6 hours as needed for pain x 10 days.     Follow Up   Return for Next scheduled follow up.  Patient was given instructions and counseling regarding his condition or for health maintenance advice. Please see specific information pulled into the AVS if appropriate.

## 2021-03-09 ENCOUNTER — OFFICE VISIT (OUTPATIENT)
Dept: FAMILY MEDICINE CLINIC | Facility: CLINIC | Age: 56
End: 2021-03-09

## 2021-03-09 VITALS
DIASTOLIC BLOOD PRESSURE: 70 MMHG | TEMPERATURE: 97.3 F | SYSTOLIC BLOOD PRESSURE: 150 MMHG | HEART RATE: 112 BPM | BODY MASS INDEX: 35.55 KG/M2 | WEIGHT: 277 LBS | OXYGEN SATURATION: 92 % | HEIGHT: 74 IN

## 2021-03-09 DIAGNOSIS — Z00.00 ANNUAL PHYSICAL EXAM: Primary | ICD-10-CM

## 2021-03-09 PROCEDURE — 99396 PREV VISIT EST AGE 40-64: CPT | Performed by: NURSE PRACTITIONER

## 2021-03-09 NOTE — PROGRESS NOTES
"Chief Complaint  Annual Exam (& review labs)    Subjective          R Janak Morris presents to [facility]  Mr. Morris presents today for his annual physical with lab review. States his back pain is starting to improve since yesterdays visit.       I have reviewed the patient's medical history in detail and updated the computerized patient record.    Current Outpatient Medications:   •  Doxylamine Succinate, Sleep, (SLEEP AID PO), Take 1 tablet by mouth Every Night., Disp: , Rfl:   •  lisinopril (PRINIVIL,ZESTRIL) 10 MG tablet, TAKE ONE TABLET BY MOUTH DAILY, Disp: 30 tablet, Rfl: 5  •  predniSONE (DELTASONE) 10 MG (21) dose pack, Use as directed on package, Disp: 1 each, Rfl: 0  •  TiZANidine (ZANAFLEX) 4 MG capsule, Take 1 capsule by mouth 3 (Three) Times a Day for 14 days., Disp: 42 capsule, Rfl: 0  •  traMADol (ULTRAM) 50 MG tablet, Take 1 tablet by mouth Every 6 (Six) Hours As Needed for Severe Pain ., Disp: 40 tablet, Rfl: 0  Objective   Vital Signs:   /70 (BP Location: Right arm, Patient Position: Sitting, Cuff Size: Adult)   Pulse 112   Temp 97.3 °F (36.3 °C) (Infrared)   Ht 188 cm (74\")   Wt 126 kg (277 lb)   SpO2 92%   BMI 35.56 kg/m²     Physical Exam  Vitals reviewed.   Constitutional:       Appearance: Normal appearance.   HENT:      Right Ear: Tympanic membrane normal.      Left Ear: Tympanic membrane normal.   Cardiovascular:      Rate and Rhythm: Normal rate and regular rhythm.      Pulses: Normal pulses.      Heart sounds: Normal heart sounds.   Pulmonary:      Effort: Pulmonary effort is normal.      Breath sounds: Normal breath sounds.   Skin:     General: Skin is warm and dry.   Neurological:      Mental Status: He is alert and oriented to person, place, and time.   Psychiatric:      Comments: No acute distress        Result Review :     Common labs    Common Labsle 8/12/20 3/2/21 3/2/21 3/2/21 3/2/21 3/2/21 3/2/21     0925 0925 0925 0925 0925 " 0925   Glucose   99       BUN   12       Creatinine   0.91       eGFR Non  Am   87       eGFR African Am   106       Sodium   141       Potassium   4.7       Chloride   102       Calcium   9.8       Total Protein   7.2       Albumin   4.80       Total Bilirubin   0.6       Alkaline Phosphatase   63       AST (SGOT)   16       ALT (SGPT)   23       WBC  7.12        Hemoglobin  16.0        Hematocrit  47.2        Platelets  243        Total Cholesterol 188   215 (A)      Triglycerides 135   128      HDL Cholesterol 33 (A)   35 (A)      LDL Cholesterol  128 (A)   157 (A)      Hemoglobin A1C       5.70 (A)   Microalbumin, Urine      <3.0    PSA     0.483     (A) Abnormal value       Comments are available for some flowsheets but are not being displayed.                     Assessment and Plan    Diagnoses and all orders for this visit:    1. Annual physical exam (Primary)    Mr. Morris appears to be doing well.  I have reviewed his labs with him today.  His lipid levels have increased.   We discussed that he needs to use diet and exercise to help control his glucose levels.       Follow Up   Return in about 1 year (around 3/9/2022) for Annual physical, Fasting labs 1 week prior to next f/u.  Patient was given instructions and counseling regarding his condition or for health maintenance advice. Please see specific information pulled into the AVS if appropriate.       High BMI Plan  General weight loss/lifestyle modification strategies discussed (elicit support from others; identify saboteurs; non-food rewards, etc).

## 2021-03-15 ENCOUNTER — OFFICE VISIT (OUTPATIENT)
Dept: FAMILY MEDICINE CLINIC | Facility: CLINIC | Age: 56
End: 2021-03-15

## 2021-03-15 VITALS
HEIGHT: 74 IN | BODY MASS INDEX: 36.06 KG/M2 | WEIGHT: 281 LBS | OXYGEN SATURATION: 94 % | HEART RATE: 70 BPM | TEMPERATURE: 97.3 F | SYSTOLIC BLOOD PRESSURE: 126 MMHG | DIASTOLIC BLOOD PRESSURE: 82 MMHG

## 2021-03-15 DIAGNOSIS — M25.512 LEFT SHOULDER PAIN, UNSPECIFIED CHRONICITY: Primary | ICD-10-CM

## 2021-03-15 PROCEDURE — 72040 X-RAY EXAM NECK SPINE 2-3 VW: CPT | Performed by: NURSE PRACTITIONER

## 2021-03-15 PROCEDURE — 99214 OFFICE O/P EST MOD 30 MIN: CPT | Performed by: NURSE PRACTITIONER

## 2021-03-15 PROCEDURE — 73030 X-RAY EXAM OF SHOULDER: CPT | Performed by: NURSE PRACTITIONER

## 2021-03-15 RX ORDER — HYDROCODONE BITARTRATE AND ACETAMINOPHEN 7.5; 325 MG/1; MG/1
1 TABLET ORAL EVERY 6 HOURS PRN
Qty: 40 TABLET | Refills: 0 | Status: SHIPPED | OUTPATIENT
Start: 2021-03-15 | End: 2021-07-15

## 2021-03-15 NOTE — PROGRESS NOTES
"Chief Complaint  Back Pain (& arm pain still)    Subjective          R Janak Morris presents to [facility]  Shoulder Injury   The left shoulder is affected. There was no injury mechanism. The quality of the pain is described as aching (sharp ). Radiates to: from left shoulder to his elbow. The pain is at a severity of 9/10. The pain is severe. Pertinent negatives include no chest pain, muscle weakness, numbness or tingling. Nothing aggravates the symptoms. He has tried ice and rest (tramadol, stretching exercises) for the symptoms. The treatment provided no relief.     I have reviewed the patient's medical history in detail and updated the computerized patient record.    Current Outpatient Medications:   •  Doxylamine Succinate, Sleep, (SLEEP AID PO), Take 1 tablet by mouth Every Night., Disp: , Rfl:   •  lisinopril (PRINIVIL,ZESTRIL) 10 MG tablet, TAKE ONE TABLET BY MOUTH DAILY, Disp: 30 tablet, Rfl: 5  •  TiZANidine (ZANAFLEX) 4 MG capsule, Take 1 capsule by mouth 3 (Three) Times a Day for 14 days., Disp: 42 capsule, Rfl: 0  •  HYDROcodone-acetaminophen (Norco) 7.5-325 MG per tablet, Take 1 tablet by mouth Every 6 (Six) Hours As Needed for Severe Pain ., Disp: 40 tablet, Rfl: 0     Objective   Vital Signs:   /82 (BP Location: Right arm, Patient Position: Sitting, Cuff Size: Large Adult)   Pulse 70   Temp 97.3 °F (36.3 °C) (Infrared)   Ht 188 cm (74\")   Wt 127 kg (281 lb)   SpO2 94%   BMI 36.08 kg/m²       Physical Exam  Vitals reviewed.   Constitutional:       Appearance: Normal appearance.   Cardiovascular:      Rate and Rhythm: Normal rate and regular rhythm.      Pulses: Normal pulses.      Heart sounds: Normal heart sounds.   Pulmonary:      Effort: Pulmonary effort is normal.      Breath sounds: Normal breath sounds.   Musculoskeletal:      Right shoulder: Normal.      Left shoulder: No swelling or tenderness. Normal range of motion. Normal strength.      " Left upper arm: No swelling, edema or tenderness.   Skin:     General: Skin is warm and dry.   Neurological:      Mental Status: He is alert and oriented to person, place, and time.   Psychiatric:      Comments: No acute distress        Result Review :                 Assessment and Plan    Diagnoses and all orders for this visit:    1. Left shoulder pain, unspecified chronicity (Primary)  -     XR Spine Cervical 2 or 3 View  -     XR Shoulder 2+ View Left  -     HYDROcodone-acetaminophen (Norco) 7.5-325 MG per tablet; Take 1 tablet by mouth Every 6 (Six) Hours As Needed for Severe Pain .  Dispense: 40 tablet; Refill: 0    Mr. Morris returns with worsening left shoulder pain that radiates down his left upper arm. I have ordered and reviewed X-rays of both his left shoulder and neck. I do not see any acute abnormality.   He is to stop taking the Tramadol and may start taking Hydrocodone-acetaminophen 7.5-325 mg every 6 hours as needed for pain.   I am referring him to orthopedic surgery for further evaluation and management.     Follow Up   No follow-ups on file.  Patient was given instructions and counseling regarding his condition or for health maintenance advice. Please see specific information pulled into the AVS if appropriate.

## 2021-03-17 ENCOUNTER — TELEPHONE (OUTPATIENT)
Dept: FAMILY MEDICINE CLINIC | Facility: CLINIC | Age: 56
End: 2021-03-17

## 2021-03-17 NOTE — TELEPHONE ENCOUNTER
Pt said [facility] can only see the radiologist report.  They want either a copy of the disc or a fax of the xray. FAX #[phone]   Thanks, YVETTE

## 2021-03-17 NOTE — TELEPHONE ENCOUNTER
Called patient and let him know the disk is ready for .  Patient stated he will stop by in the morning to pick it up.

## 2021-03-18 ENCOUNTER — TELEPHONE (OUTPATIENT)
Dept: FAMILY MEDICINE CLINIC | Facility: CLINIC | Age: 56
End: 2021-03-18

## 2021-03-18 NOTE — TELEPHONE ENCOUNTER
Called patient and told him to drop the paperwork off at his convenience and Jennifer CLEMENTE will complete.  Patient asked for copy to mailed to him when complete and he will provide a fax number to his HR.

## 2021-03-18 NOTE — TELEPHONE ENCOUNTER
PATIENT IS CALLING IN HE STATES HE WOULD LIKE TO DROP OFF SOME PAPERWORK TO [facility] FOR SHORT TERM DISABILITY.    PLEASE ADVISE    CALLBACK NUMBER  [phone]

## 2021-03-22 ENCOUNTER — TELEPHONE (OUTPATIENT)
Dept: FAMILY MEDICINE CLINIC | Facility: CLINIC | Age: 56
End: 2021-03-22

## 2021-03-22 DIAGNOSIS — S46.912A MUSCLE STRAIN OF LEFT SHOULDER, INITIAL ENCOUNTER: ICD-10-CM

## 2021-03-22 RX ORDER — TIZANIDINE HYDROCHLORIDE 4 MG/1
4 CAPSULE, GELATIN COATED ORAL 3 TIMES DAILY
Qty: 42 CAPSULE | Refills: 0 | Status: SHIPPED | OUTPATIENT
Start: 2021-03-22 | End: 2021-04-05

## 2021-03-22 NOTE — TELEPHONE ENCOUNTER
PATIENT CALLED IN STATING HE DROPPED PPW OFF AT THE OFFICE ON Friday FOR SHORT TERM DISABILITY, HE WANTS TO KNOW IF IT HAS BEEN FILLED OUT?    BEST CALL BACK # [phone]

## 2021-03-26 DIAGNOSIS — M25.512 LEFT SHOULDER PAIN, UNSPECIFIED CHRONICITY: ICD-10-CM

## 2021-03-26 RX ORDER — HYDROCODONE BITARTRATE AND ACETAMINOPHEN 7.5; 325 MG/1; MG/1
1 TABLET ORAL EVERY 6 HOURS PRN
Qty: 40 TABLET | Refills: 0 | OUTPATIENT
Start: 2021-03-26

## 2021-03-26 NOTE — TELEPHONE ENCOUNTER
Caller: LALO Morris    Relationship: Self    Best call back number: [phone] (H)    Medication needed:   HYDROcodone-acetaminophen (Norco) 7.5-325 MG per tablet  1 tablet, Every 6 Hours PRN 0 ordered         Summary: Take 1 tablet by mouth Every 6 (Six) Hours As Needed for Severe Pain ., Starting Mon 3/15/2021, Normal            When do you need the refill by: ASAP    What additional details did the patient provide when requesting the medication: PATIENT CALLED TO REQUEST A MEDICATION REFILL ON HIS/HER MEDICATION. PATIENT STATES THAT HE HAS A 1 1/2 DAY SUPPLY LEFT.       Does the patient have less than a 3 day supply:  [x] Yes  [] No    What is the patient's preferred pharmacy:    [facility] [address] [place] [address] ISMON CRISTINA  - [phone] [facility] [phone] FX      THANKS

## 2021-03-29 NOTE — TELEPHONE ENCOUNTER
Patient called and requested a refill of the pain medication he was prescribed.  Per Jennifer CLEMENTE, patient can not have another refill of the medication due to it being a one-time prescription and not a long term medication.  Patient stated he has tried Ibuprofen 200mg qid and Tylenol and neither have touched the pain.  Per discussion with Jennifer CLEMENTE, she can send in a prescription for Diclofenac ER bid.  Patient was informed and stated that he has some of that and it does not touch the pain that he is having and does not want it.  Patient said thanks and hung up the phone.

## 2021-03-30 DIAGNOSIS — I10 ESSENTIAL HYPERTENSION: ICD-10-CM

## 2021-03-30 RX ORDER — LISINOPRIL 10 MG/1
TABLET ORAL
Qty: 30 TABLET | Refills: 11 | Status: SHIPPED | OUTPATIENT
Start: 2021-03-30 | End: 2022-04-05 | Stop reason: SDUPTHER

## 2021-04-20 ENCOUNTER — OFFICE VISIT (OUTPATIENT)
Dept: ORTHOPEDIC SURGERY | Facility: CLINIC | Age: 56
End: 2021-04-20

## 2021-04-20 DIAGNOSIS — M25.512 CHRONIC LEFT SHOULDER PAIN: ICD-10-CM

## 2021-04-20 DIAGNOSIS — G89.29 CHRONIC LEFT SHOULDER PAIN: ICD-10-CM

## 2021-04-20 DIAGNOSIS — G89.29 CHRONIC RIGHT SHOULDER PAIN: ICD-10-CM

## 2021-04-20 DIAGNOSIS — M62.838 MUSCLE SPASM: ICD-10-CM

## 2021-04-20 DIAGNOSIS — M54.12 CERVICAL RADICULOPATHY: Primary | ICD-10-CM

## 2021-04-20 DIAGNOSIS — M25.511 CHRONIC RIGHT SHOULDER PAIN: ICD-10-CM

## 2021-04-20 PROCEDURE — 99204 OFFICE O/P NEW MOD 45 MIN: CPT | Performed by: PHYSICIAN ASSISTANT

## 2021-04-20 RX ORDER — ACETAMINOPHEN 500 MG
500 TABLET ORAL DAILY PRN
COMMUNITY
End: 2021-12-16

## 2021-04-20 RX ORDER — IBUPROFEN 200 MG
200 TABLET ORAL DAILY PRN
COMMUNITY
End: 2021-12-16

## 2021-04-20 RX ORDER — CYCLOBENZAPRINE HCL 10 MG
10 TABLET ORAL 3 TIMES DAILY PRN
Qty: 30 TABLET | Refills: 1 | Status: SHIPPED | OUTPATIENT
Start: 2021-04-20 | End: 2021-05-05 | Stop reason: SDUPTHER

## 2021-04-20 RX ORDER — PREDNISONE 20 MG/1
TABLET ORAL
Qty: 15 TABLET | Refills: 0 | Status: SHIPPED | OUTPATIENT
Start: 2021-04-20 | End: 2021-07-15

## 2021-04-20 NOTE — PROGRESS NOTES
Chief Complaint  Pain of the Left Shoulder    Subjective    History of Present Illness      LALO Morris is a 55 y.o. male who presents to [facility] for complaint of bilateral shoulder pain although left worse than right.  He also reports cervical pain.  He was referred to our office by his PCP and has had 2 view shoulder x-ray of left shoulder and cervical spine 2 views prior to today's appointment.  He reports pain began approximately 3 months ago although he has had intermittent pain in the neck and upper back region for several years.  Over the last 3 months his pain has significantly worsened to a pain level of 9 out of 10.  He reports his pain as a grinding/stabbing/shooting/burning pain.  He states he has been using physical therapy exercises at home as well as tramadol, hydrocodone, NSAIDs that his PCP has prescribed without much relief.  He was also prescribed a steroid pack about 6 weeks ago which did seem to help some although once he completed the medicine the pain returned.  He reports pain that radiates from the posterior aspect of left shoulder down to the elbow and occasionally into the hand.  He also reports occasional numbness and tingling of the same arm that occurs about once a week.  At this point nothing alleviates his pain although activity does seem to make the pain worse.        Objective   Vital Signs:   There were no vitals taken for this visit.    Physical Exam  Vitals signs and nursing note reviewed.   Constitutional:       Appearance: Normal appearance.   Pulmonary:      Effort: Pulmonary effort is normal.   Skin:     General: Skin is warm and dry.      Capillary Refill: Capillary refill takes less than 2 seconds.   Neurological:      General: No focal deficit present.      Mental Status: He is alert and oriented to person, place, and time. Mental status is at baseline.   Psychiatric:         Mood and Affect: Mood normal.         Behavior: Behavior normal.          Thought Content: Thought content normal.         Judgment: Judgment normal.     Ortho Exam   BILATERAL shoulder  Positive for pain and tenderness with palpation over the subcromial bursa.   Positive for signs of impingement with internal and external rotation.   Forward flexion is 0- 120 degrees, abduction is 0-120 degrees, external rotation is 0-55 degrees.   Rotator cuff function is fairly well preserved except impingement at 90 degrees.   Clemens's sign is positive. Neer sign is positive.   Sulcus sign is negative. Drop arm sign is negative.   Apprehension sign is negative.   Axillary nerve function is well preserved. Radial artery pulses are palpable.   There is no evidence of multidirectional instability.   The pain level is 7.     Cervical Spine  Skin and soft tissues are mildly swollen.   No objective motor or sensory function deficit is present.   No bowel or bladder deficit. Mild spasm of erector spinae muscle is present.  Lateral rotations of the spine are limited in range of motion and painful for the patient.   No evidence of long tract signs is noted.    bilaterally Sided lateral rotation is associated with pain and discomfort.       Result Review :   Radiologic studies - see below for interpretation  Cspine 2 views and Left shoulder 2v xrays were performed at outlying facility. These images were independently viewed and interpreted by myself, my impression as follows:  Cspine: mild degenerative changes throughout, greatest findings at C6-C7. C7 vertebral body not completely visualized  Left shoulder: views not adequate to determine GH joint space, AC shows mild arthrosis        Assessment   Assessment and Plan    Problem List Items Addressed This Visit     None      Visit Diagnoses     Cervical radiculopathy    -  Primary    Relevant Medications    predniSONE (DELTASONE) 20 MG tablet    cyclobenzaprine (FLEXERIL) 10 MG tablet    Other Relevant Orders    MRI Cervical Spine Without Contrast    MRI  Shoulder Left Without Contrast    EMG & Nerve Conduction Test    Chronic left shoulder pain        Relevant Medications    predniSONE (DELTASONE) 20 MG tablet    cyclobenzaprine (FLEXERIL) 10 MG tablet    Other Relevant Orders    MRI Cervical Spine Without Contrast    MRI Shoulder Left Without Contrast    EMG & Nerve Conduction Test    Muscle spasm        Relevant Medications    cyclobenzaprine (FLEXERIL) 10 MG tablet    Other Relevant Orders    MRI Shoulder Left Without Contrast    EMG & Nerve Conduction Test    Chronic right shoulder pain        Relevant Orders    EMG & Nerve Conduction Test          Follow Up   · Discussion of any imaging in detail. Discussion of orthopaedic goals. Discussed treatment options: further imaging of Cspine and Left shoulder with MRI, further testing with EMG/NCS. Discussed the likelihood his symptoms are originating from the neck and although we do not treat the spine, I will proceed with ordering further imaging so we can get him to the most appropriate provider for further care.  · Prednisone and flexeril sent to his pharmacy.  · Ice, heat, and/or modalities as beneficial  · Patient is encouraged to call or return for any issues or concerns.  · No brace was given at today's visit.  · Follow up will be based on when MRI has been performed  • Patient was given instructions and counseling regarding his condition or for health maintenance advice. Please see specific information pulled into the AVS if appropriate.     Henry Ashford PA-C   Date of Encounter: 4/20/2021   Electronically signed by Henry Ashford PA-C, 04/20/21, 5:34 AM EDT.   Electronically signed by Henry Ashford PA-C, 04/20/21, 11:23 AM EDT.     EMR Dragon/Transcription disclaimer:  Much of this encounter note is an electronic transcription/translation of spoken language to printed text. The electronic translation of spoken language may permit erroneous, or at times, nonsensical words or phrases to be  inadvertently transcribed; Although I have reviewed the note for such errors, some may still exist.

## 2021-05-05 DIAGNOSIS — M54.12 CERVICAL RADICULOPATHY: ICD-10-CM

## 2021-05-05 DIAGNOSIS — M62.838 MUSCLE SPASM: ICD-10-CM

## 2021-05-05 DIAGNOSIS — G89.29 CHRONIC LEFT SHOULDER PAIN: ICD-10-CM

## 2021-05-05 DIAGNOSIS — M25.512 CHRONIC LEFT SHOULDER PAIN: ICD-10-CM

## 2021-05-05 RX ORDER — CYCLOBENZAPRINE HCL 10 MG
10 TABLET ORAL 3 TIMES DAILY PRN
Qty: 30 TABLET | Refills: 1 | Status: SHIPPED | OUTPATIENT
Start: 2021-05-05 | End: 2021-06-05 | Stop reason: SDUPTHER

## 2021-05-14 ENCOUNTER — OFFICE VISIT (OUTPATIENT)
Dept: ORTHOPEDIC SURGERY | Facility: CLINIC | Age: 56
End: 2021-05-14

## 2021-05-14 ENCOUNTER — TELEPHONE (OUTPATIENT)
Dept: ORTHOPEDIC SURGERY | Facility: CLINIC | Age: 56
End: 2021-05-14

## 2021-05-14 DIAGNOSIS — M67.819 TENDINOSIS OF ROTATOR CUFF: ICD-10-CM

## 2021-05-14 DIAGNOSIS — M54.12 CERVICAL RADICULOPATHY: ICD-10-CM

## 2021-05-14 DIAGNOSIS — M50.30 DEGENERATIVE DISC DISEASE, CERVICAL: Primary | ICD-10-CM

## 2021-05-14 DIAGNOSIS — M19.012 ARTHRITIS OF LEFT ACROMIOCLAVICULAR JOINT: ICD-10-CM

## 2021-05-14 PROCEDURE — 99441 PR PHYS/QHP TELEPHONE EVALUATION 5-10 MIN: CPT | Performed by: PHYSICIAN ASSISTANT

## 2021-05-14 NOTE — PROGRESS NOTES
You have chosen to receive care through a telephone visit. Do you consent to use a telephone visit for your medical care today? Yes     Chief Complaint  Results of the Left Shoulder and Results of the Cervical Spine    Subjective    History of Present Illness      LALO Morris is a 55 y.o. male who presents to [facility] via telephone visit for follow-up on left shoulder pain and MRI results of left shoulder.  I last saw him on 4/20/2021 with reports of bilateral shoulder pain, left greater than right, and cervical pain.  He reported his pain began 3 months prior but has had intermittent neck and back pain for several years.  His pain had significantly worsened over the last 3 months without significant relief from NSAIDs, steroids, pain medication, and therapy exercises.  He states he felt better after taking the prednisone and Flexeril although when he had the EMG/NCS test performed his symptoms started to return.      Objective   Vital Signs:   There were no vitals taken for this visit.    Physical Exam  Musculoskeletal:      Comments: See detailed ortho exam from prior visit   Neurological:      Mental Status: He is alert and oriented to person, place, and time. Mental status is at baseline.   Psychiatric:         Mood and Affect: Mood normal.         Behavior: Behavior normal.         Thought Content: Thought content normal.         Judgment: Judgment normal.       Ortho Exam   BILATERAL shoulder  Positive for pain and tenderness with palpation over the subcromial bursa.   Positive for signs of impingement with internal and external rotation.   Forward flexion is 0- 120 degrees, abduction is 0-120 degrees, external rotation is 0-55 degrees.   Rotator cuff function is fairly well preserved except impingement at 90 degrees.   Clemens's sign is positive. Neer sign is positive.   Sulcus sign is negative. Drop arm sign is negative.   Apprehension sign is negative.   Axillary nerve  function is well preserved. Radial artery pulses are palpable.   There is no evidence of multidirectional instability.   The pain level is 7.      Cervical Spine  Skin and soft tissues are mildly swollen.   No objective motor or sensory function deficit is present.   No bowel or bladder deficit. Mild spasm of erector spinae muscle is present.  Lateral rotations of the spine are limited in range of motion and painful for the patient.   bilaterally Sided lateral rotation is associated with pain and discomfort.   The above exams are historical and is used as a reference for this telephone visit.        Result Review :   Radiologic studies - see below for interpretation  Reviewed MRI report of left shoulder and cervical spine without contrast, performed at  [facility] on 5/3/2021, summary of impression below:  · Left shoulder reveals tendinosis of the supraspinatus and infraspinatus without tear, AC joint showing moderate degenerative changes, and posterior labrum showing probable degenerative tearing although cannot be completely evaluated due to lack of contrast  · Cervical spine reveals degenerative changes throughout as follows:  · C3-C4: Facet and uncovertebral joint hypertrophic change, disc osteophyte complex, severe right neuroforaminal stenosis without central stenosis  · C4-C5 Facet and uncovertebral joint hypertrophic change, disc osteophyte complex, moderate right neuroforaminal stenosis without central stenosis  · C5-C6 Facet and uncovertebral joint hypertrophic change, disc osteophyte complex, moderate central canal stenosis, mild bilateral neural foraminal stenosis  · C6-C7 Facet and uncovertebral joint hypertrophic change, disc osteophyte complex, mild central canal stenosis, moderate bilateral neural foraminal stenosis  · C7-T1 Facet and uncovertebral joint hypertrophic change, moderate left neural foraminal stenosis without central stenosis        Assessment   Assessment and Plan     Problem List Items Addressed This Visit        Musculoskeletal and Injuries    Tendinosis of rotator cuff    Arthritis of left acromioclavicular joint       Neuro    Degenerative disc disease, cervical - Primary    Relevant Orders    Ambulatory Referral to Orthopedic Surgery    Cervical radiculopathy    Relevant Orders    Ambulatory Referral to Orthopedic Surgery          Follow Up   · Discussion of any imaging in detail. Discussion of orthopaedic goals.  · Risk, benefits, and merits of treatment alternatives reviewed with the patient. Treatment alternatives include: referral to orthopedic spine or neurosurgery for evaluation and consult on cspine findings  · Ice, heat, and/or modalities as beneficial  · Patient is encouraged to call or return for any issues or concerns.  · Follow up for the shoulder findings and EMG findings related to carpal and cubital tunnel will be as needed. I would first like to get a consult on the spine before making any further treatment decisions.  • Patient was given instructions and counseling regarding his condition or for health maintenance advice. Please see specific information pulled into the AVS if appropriate.   This visit has been rescheduled as a phone visit to comply with patient safety concerns in accordance with CDC recommendations. Total time of discussion was 8 minutes.     Henry Ashford PA-C   Date of Encounter: 5/14/2021   Electronically signed by Henry Ashford PA-C, 05/14/21, 2:19 PM EDT.     EMR Dragon/Transcription disclaimer:  Much of this encounter note is an electronic transcription/translation of spoken language to printed text. The electronic translation of spoken language may permit erroneous, or at times, nonsensical words or phrases to be inadvertently transcribed; Although I have reviewed the note for such errors, some may still exist.

## 2021-05-14 NOTE — TELEPHONE ENCOUNTER
Caller: NIC [facility]     Best call back number: [phone]    What form or medical record are you requesting: INSURANCE AUTHORIZATION FOR MRI C SPINE AND LEFT SHOULDER     Who is requesting this form or medical record from you: NIC     How would you like to receive the form or medical records (pick-up, mail, fax): FAX    If fax, what is the fax number:[phone]       PATIENTS MRI APPT IS ON 05/17/21

## 2021-05-18 ENCOUNTER — TELEPHONE (OUTPATIENT)
Dept: ORTHOPEDIC SURGERY | Facility: CLINIC | Age: 56
End: 2021-05-18

## 2021-05-18 NOTE — TELEPHONE ENCOUNTER
The referral was placed on Friday. I suppose someone within [facility] will schedule that?   For some reason I was thinking that he was staying off work until he saw Adonay?

## 2021-05-18 NOTE — TELEPHONE ENCOUNTER
Caller: LALO PETTY   Relationship to Patient: SELF     Phone Number: [phone]   Reason for Call: PATIENT CALLED LOOKING FOR HIS REFERRAL FOR DR PHILLIPS, GOT PATIENT SCHEDULED FOR 07/06/21, PATIENT STATES THAT HE HAS TO GO BACK TO WORK THIS Saturday AND NEEDS TO KNOW IF THAT IS GOING TO BE OK. PATIENT STATES HE HAS TO TAKE A PHYSICAL 3 DAYS PRIOR TO GOING BACK AND HE IS UNSURE IF HE WILL BE ABLE TO PASS.

## 2021-05-19 NOTE — TELEPHONE ENCOUNTER
PATIENT CALLED BACK TODAY ASKING FOR A NOTE TO BE OFF WORK UNTIL HE SEES DR PHILLIPS IN July. HIS HR PERSON TOLD HIM TO CALL US BECAUSE HE WILL NOT PASS THE REQUIRE PHYSICAL EXAM ON Saturday AND IS IN JEOPARDY OF LOSING HIS JOB .    PLEASE ADVISE

## 2021-05-20 NOTE — TELEPHONE ENCOUNTER
PATIENT CALLED CHECKING STATUS OF WORK NOTE REQUEST.  I SPOKE WITH POLY AND SHE SAID IT IS OK TO GIVE PATIENT NOTE TO REMAIN OFF WORK UNTIL HE SEES DR. PHILLIPS IN July.  PATIENT ASKED THAT NOTE BE PLACED IN [employer]

## 2021-06-05 DIAGNOSIS — M54.12 CERVICAL RADICULOPATHY: ICD-10-CM

## 2021-06-05 DIAGNOSIS — G89.29 CHRONIC LEFT SHOULDER PAIN: ICD-10-CM

## 2021-06-05 DIAGNOSIS — M25.512 CHRONIC LEFT SHOULDER PAIN: ICD-10-CM

## 2021-06-05 DIAGNOSIS — M62.838 MUSCLE SPASM: ICD-10-CM

## 2021-06-07 ENCOUNTER — CLINICAL SUPPORT (OUTPATIENT)
Dept: ORTHOPEDIC SURGERY | Facility: CLINIC | Age: 56
End: 2021-06-07

## 2021-06-07 VITALS — BODY MASS INDEX: 36.06 KG/M2 | HEIGHT: 74 IN | WEIGHT: 281 LBS | TEMPERATURE: 97.2 F

## 2021-06-07 DIAGNOSIS — G89.29 CHRONIC LEFT SHOULDER PAIN: Primary | ICD-10-CM

## 2021-06-07 DIAGNOSIS — M25.511 CHRONIC RIGHT SHOULDER PAIN: ICD-10-CM

## 2021-06-07 DIAGNOSIS — M25.512 CHRONIC LEFT SHOULDER PAIN: Primary | ICD-10-CM

## 2021-06-07 DIAGNOSIS — G89.29 CHRONIC RIGHT SHOULDER PAIN: ICD-10-CM

## 2021-06-07 DIAGNOSIS — M67.819 TENDINOSIS OF ROTATOR CUFF: ICD-10-CM

## 2021-06-07 PROCEDURE — 99212 OFFICE O/P EST SF 10 MIN: CPT | Performed by: PHYSICIAN ASSISTANT

## 2021-06-07 PROCEDURE — 20610 DRAIN/INJ JOINT/BURSA W/O US: CPT | Performed by: PHYSICIAN ASSISTANT

## 2021-06-07 RX ORDER — METHYLPREDNISOLONE ACETATE 80 MG/ML
160 INJECTION, SUSPENSION INTRA-ARTICULAR; INTRALESIONAL; INTRAMUSCULAR; SOFT TISSUE
Status: COMPLETED | OUTPATIENT
Start: 2021-06-07 | End: 2021-06-07

## 2021-06-07 RX ORDER — CYCLOBENZAPRINE HCL 10 MG
10 TABLET ORAL 3 TIMES DAILY PRN
Qty: 30 TABLET | Refills: 1 | Status: SHIPPED | OUTPATIENT
Start: 2021-06-07 | End: 2021-06-29 | Stop reason: SDUPTHER

## 2021-06-07 RX ADMIN — METHYLPREDNISOLONE ACETATE 160 MG: 80 INJECTION, SUSPENSION INTRA-ARTICULAR; INTRALESIONAL; INTRAMUSCULAR; SOFT TISSUE at 12:51

## 2021-06-07 NOTE — PROGRESS NOTES
"Chief Complaint  Follow-up and Pain of the Right Shoulder, Follow-up and Pain of the Left Shoulder, and Injections    Subjective    History of Present Illness      LALO Morris is a 55 y.o. male who presents to [facility] for is here today for injection therapy. He is receiving first time  BILATERAL shoulder injections of Depo-Medrol. He currently awaiting an appointment with orthopedic spine due to abnormal MRI of his neck, which I have felt could be contributing to his symptoms in the shoulders. I have also obtained an Mri of the left shoulder which has shown tendinosis of supraspinatus and infraspinatus, moderate AC joint OA, and probable degenerative labral tearing. His ortho spine appointment is not until early July and he is seeking relief of the shoulder pain. Treatment options have been discussed and he understands and consents. He has not had an MRI of the right shoulder and he states it is hurting worse than the left.      Objective   Vital Signs:   Temp 97.2 °F (36.2 °C)   Ht 188 cm (74.02\")   Wt 127 kg (281 lb)   BMI 36.06 kg/m²     Physical Exam  55 y.o. male is awake, alert, oriented, in no acute distress and well developed, well nourished.  Ortho Exam   BILATERAL shoulder  Positive for pain and tenderness with palpation over the subcromial bursa.   Positive for signs of impingement with internal and external rotation.   Forward flexion is 0- 120 degrees, abduction is 0-120 degrees, external rotation is 0-55 degrees.   Rotator cuff function is fairly well preserved except impingement at 90 degrees.   Clemens's sign is positive. Neer sign is positive.   Sulcus sign is negative. Drop arm sign is negative.   Apprehension sign is negative.   Axillary nerve function is well preserved. Radial artery pulses are palpable.   There is no evidence of multidirectional instability.   The pain level is 7.       PROCEDURE  Large Joint Arthrocentesis: L glenohumeral  Date/Time: 6/7/2021 " 12:51 PM  Consent given by: patient  Site marked: site marked  Timeout: Immediately prior to procedure a time out was called to verify the correct patient, procedure, equipment, support staff and site/side marked as required   Supporting Documentation  Indications: pain and joint swelling   Procedure Details  Location: shoulder - L glenohumeral  Preparation: Patient was prepped and draped in the usual sterile fashion  Needle size: 25 G  Approach: posterior  Medications administered: 160 mg methylPREDNISolone acetate 80 MG/ML; 2 mL lidocaine (cardiac)  Patient tolerance: patient tolerated the procedure well with no immediate complications    Large Joint Arthrocentesis: R glenohumeral  Date/Time: 6/7/2021 12:51 PM  Consent given by: patient  Site marked: site marked  Timeout: Immediately prior to procedure a time out was called to verify the correct patient, procedure, equipment, support staff and site/side marked as required   Supporting Documentation  Indications: pain and joint swelling   Procedure Details  Location: shoulder - R glenohumeral  Preparation: Patient was prepped and draped in the usual sterile fashion  Needle size: 25 G  Approach: anteromedial  Medications administered: 160 mg methylPREDNISolone acetate 80 MG/ML; 2 mL lidocaine (cardiac)  Patient tolerance: patient tolerated the procedure well with no immediate complications         Injection site was identified by physical examination and cleaned with Betadine and alcohol swabs. Prior to needle insertion, ethyl chloride spray was used for surface anesthesia. Sterile technique was used.       Assessment   Assessment and Plan    Problem List Items Addressed This Visit        Musculoskeletal and Injuries    Tendinosis of rotator cuff    Chronic right shoulder pain    Relevant Orders    Large Joint Arthrocentesis: R glenohumeral    MRI shoulder right wo contrast    Chronic left shoulder pain - Primary    Relevant Orders    Large Joint Arthrocentesis: L  glenohumeral        I spent 12 minutes caring for R on this date of service. This time includes time spent by me in the following activities:reviewing tests, performing a medically appropriate examination and/or evaluation , ordering medications, tests, or procedures and documenting information in the medical record.  Follow Up   • Bilateral shoulder Depo-Medrol injection was discussed with the patient. Discussed indication, risks, benefits, and alternatives. Verbal consent was given to proceed with the procedure. Patient tolerated the procedure well and no complications were encountered.  • Will order MRI of the right shoulder. Once I have that back I will determine if it is truly the shoulder causing the issue or if I feel it is coming from the cspine. I may try to get him into ortho spine/neurosurgery quicker due to the amount of discomfort he is experiencing. He also expressed concern about potentially losing his job due to the time off.   • Discussion of orthopedic goals and activities and patient/guardian expressed understanding.  • Ice, heat, rest, compression and elevation of extremity as beneficial  • nsaids and/or tylenol as beneficial  • Instructed to refrain from heavy activity/rest the extremity for the next 24-48 hours  • Discussion regarding possibility of cortisol flare and what to expect if this occurs  • Watch for signs and symptoms of infection  • Call if adverse effect from injection therapy  • Follow up in 3 months/as needed  • Patient was given instructions and counseling regarding his condition or for health maintenance advice. Please see specific information pulled into the AVS if appropriate.     Henry Ashford PA-C   Date of Encounter: 6/7/2021   Electronically signed by Henry Ashford PA-C, 06/07/21, 12:00 PM EDT.     EMR Dragon/Transcription disclaimer:  Much of this encounter note is an electronic transcription/translation of spoken language to printed text. The electronic  translation of spoken language may permit erroneous, or at times, nonsensical words or phrases to be inadvertently transcribed; Although I have reviewed the note for such errors, some may still exist.

## 2021-06-15 ENCOUNTER — TELEPHONE (OUTPATIENT)
Dept: ORTHOPEDIC SURGERY | Facility: CLINIC | Age: 56
End: 2021-06-15

## 2021-06-15 NOTE — TELEPHONE ENCOUNTER
KARTHIKEYAN CALLED FROM [facility] TO GET APPROVAL TO HAVE PATIENT'S MRI RESCHEDULED. THEY ARE WORKING ON PRE-CERT WITH INSURANCE AND WAS TOLD BY INSURANCE IT COULD TAKE A WEEK TO GET APPROVAL . I GAVE HE APPROVAL TO RESCHEDULE THAT MRI. SHE WILL CONTINUE TO WORK ON APPROVAL AND FORWARD IT TO SCHEDULING TO GET IT RESCHEDULED.

## 2021-06-16 ENCOUNTER — APPOINTMENT (OUTPATIENT)
Dept: MRI IMAGING | Facility: HOSPITAL | Age: 56
End: 2021-06-16

## 2021-06-24 ENCOUNTER — HOSPITAL ENCOUNTER (OUTPATIENT)
Dept: MRI IMAGING | Facility: HOSPITAL | Age: 56
Discharge: HOME | End: 2021-06-24
Admitting: PHYSICIAN ASSISTANT

## 2021-06-24 DIAGNOSIS — M25.511 CHRONIC RIGHT SHOULDER PAIN: ICD-10-CM

## 2021-06-24 DIAGNOSIS — G89.29 CHRONIC RIGHT SHOULDER PAIN: ICD-10-CM

## 2021-06-24 PROCEDURE — 73221 MRI JOINT UPR EXTREM W/O DYE: CPT

## 2021-06-24 PROCEDURE — 73221 MRI JOINT UPR EXTREM W/O DYE: CPT | Performed by: RADIOLOGY

## 2021-06-29 DIAGNOSIS — M25.512 CHRONIC LEFT SHOULDER PAIN: ICD-10-CM

## 2021-06-29 DIAGNOSIS — G89.29 CHRONIC LEFT SHOULDER PAIN: ICD-10-CM

## 2021-06-29 DIAGNOSIS — M62.838 MUSCLE SPASM: ICD-10-CM

## 2021-06-29 DIAGNOSIS — M54.12 CERVICAL RADICULOPATHY: ICD-10-CM

## 2021-06-29 RX ORDER — CYCLOBENZAPRINE HCL 10 MG
10 TABLET ORAL 3 TIMES DAILY PRN
Qty: 30 TABLET | Refills: 1 | Status: SHIPPED | OUTPATIENT
Start: 2021-06-29 | End: 2021-07-22 | Stop reason: SDUPTHER

## 2021-06-30 ENCOUNTER — OFFICE VISIT (OUTPATIENT)
Dept: ORTHOPEDIC SURGERY | Facility: CLINIC | Age: 56
End: 2021-06-30

## 2021-06-30 VITALS — WEIGHT: 280 LBS | TEMPERATURE: 97.6 F | HEIGHT: 74 IN | BODY MASS INDEX: 35.94 KG/M2

## 2021-06-30 DIAGNOSIS — M19.011 ARTHRITIS OF RIGHT ACROMIOCLAVICULAR JOINT: ICD-10-CM

## 2021-06-30 DIAGNOSIS — M75.121 COMPLETE TEAR OF RIGHT ROTATOR CUFF, UNSPECIFIED WHETHER TRAUMATIC: Primary | ICD-10-CM

## 2021-06-30 DIAGNOSIS — M24.111 LABRAL TEAR OF SHOULDER, DEGENERATIVE, RIGHT: ICD-10-CM

## 2021-06-30 PROCEDURE — 99214 OFFICE O/P EST MOD 30 MIN: CPT | Performed by: PHYSICIAN ASSISTANT

## 2021-06-30 RX ORDER — CLINDAMYCIN PHOSPHATE 900 MG/50ML
900 INJECTION INTRAVENOUS ONCE
Status: CANCELLED | OUTPATIENT
Start: 2021-07-16 | End: 2021-06-30

## 2021-06-30 RX ORDER — CEFAZOLIN SODIUM IN 0.9 % NACL 3 G/100 ML
3 INTRAVENOUS SOLUTION, PIGGYBACK (ML) INTRAVENOUS ONCE
Status: CANCELLED | OUTPATIENT
Start: 2021-07-16 | End: 2021-06-30

## 2021-06-30 NOTE — PROGRESS NOTES
"Chief Complaint  Pain and Follow-up of the Right Shoulder and Results (RIGHT SHOULDER MRI RESULTS)    Subjective    History of Present Illness      LALO Morris is a 56 y.o. male who presents to [facility] for follow up on right shoulder pain. He returns today for follow up on MRI results. Not much has changed regarding his pain. He reports significant weakness and pain with doing activities above his shoulder. He is scheduled to see Dr. Urias for his neck on July 6th.      Objective   Vital Signs:   Temp 97.6 °F (36.4 °C)   Ht 188 cm (74.02\")   Wt 127 kg (280 lb)   BMI 35.93 kg/m²     Physical Exam  Vitals signs and nursing note reviewed.   Constitutional:       Appearance: Normal appearance.   Pulmonary:      Effort: Pulmonary effort is normal.   Skin:     General: Skin is warm and dry.      Capillary Refill: Capillary refill takes less than 2 seconds.   Neurological:      General: No focal deficit present.      Mental Status: He is alert and oriented to person, place, and time. Mental status is at baseline.   Psychiatric:         Mood and Affect: Mood normal.         Behavior: Behavior normal.         Thought Content: Thought content normal.         Judgment: Judgment normal.     Ortho Exam   RIGHT shoulder  Positive for significant tenderness at the anterior aspect of the shoulder and at the insertion of the rotator cuff over the greater tuberosity of the humerus.   Positive for tenderness with palpation of the AC joint.  Positive for mild atrophy of supraspinatus and infraspinatus fossa.  Soft tissue tenderness is noted.   Slight proximal migration of the humeral head is noted.   Forward flexion is 0-120 degrees, abduction is 0-120 degrees, external rotation is 0-50 degrees.   Positive for decreased strength in abduction and external rotation.   Crossover adduction test is positive.    Drop arm sign is positive.  Sulcus sign is negative.    Neer test is positive on " compression.  The pain level is 7.  There is no evidence of multidirectional instability.        Result Review :   Radiologic studies - see below for interpretation  Reviewed MRI report of right shoulder without contrast, performed at  [facility] on 6/24/21, summary of impression below:  · Moderate AC joint arthritis  · Distal supraspinatus tendon at humeral insertion shows a full-thickness tear with 0.8 cm tendon retraction  · Distal infraspinatus tendon shows probable small insertional full-thickness tear with no significant tendon retraction  · Infraspinatus at musculotendinous junction shows a tiny intrasubstance tear  · Teres minor muscle body shows mild fatty atrophy  · Mild subscapularis tendinosis  · Superior labrum posterior to biceps anchor shows a tear as well as a complex tear of the inferior labrum.  · Anterior and posterior labrum within normal limits  · Glenohumeral joint cartilage is intact        Assessment   Assessment and Plan    Problem List Items Addressed This Visit        Musculoskeletal and Injuries    Complete tear of right rotator cuff - Primary    Relevant Orders    Case Request    Urinalysis With Culture If Indicated -      Other Visit Diagnoses     Arthritis of right acromioclavicular joint        Relevant Orders    Case Request    Urinalysis With Culture If Indicated -    Labral tear of shoulder, degenerative, right        Relevant Orders    Case Request    Urinalysis With Culture If Indicated -          Follow Up   · Discussion of any imaging in detail. Discussion of orthopaedic goals.  · Risk, benefits, and merits of treatment alternatives reviewed with the patient. Treatment alternatives include: surgery with RCR. Discussed proceeding with scheduling of surgery but that may change depending on Dr. Urias's upcoming evaluation of the cervical spine.  Patient has been diagnosed with a rotator cuff tear.  These tears can range from partial tears to complete tears of  the muscle and/or tendon. They can also be categorized in size by width as being small, medium or large.  Diagnosis is confirmed with MRI or CT/arthrogram.  Management of these tears can be conservative with injections, physical therapy, activity modification and use of NSAIDS as needed.  Surgery is the only way to actually fix these tears, as they will not heal or repair on their own.  These tears can usually be repaired with arthroscopic surgery, but occasionally open procedures are necessary.  Many factors can influence the outcomes. First, larger tears have a higher chance of failure from a healing perspective, although pain may improve nonetheless, potentially longer recovery and sometimes, depending on the quality of the tissue and the length of time the tear has been present, may not be repairable at all.  If the tear has been going on for a long time, the muscle can actually change to fatty tissue, and no longer act as muscle.  This can have a direct effect on not only the ability to repair the tear but also the outcome from the surgery itself both from a healing and functional perspective.  Therefore, the decision to proceed with surgery does have a time factor which can affect the quality of the tissue and reparability but also the potential for the tear to increase in size.  The longer you wait to repair the torn tendon there can be decreased potential for a successful outcome.  · Risks of surgery have been discussed with the patient in detail.  These risks include but are not limited to possibility of infection, DVT, continued pain, continued progression of arthritis, use of allograft tissue, limited range of motion and strength and further surgical intervention.  Patient understands that the surgery will benefit mechanical symptoms of pain and instability but may not help with symptoms of arthritis.     · Ice, heat, and/or modalities as beneficial  · Patient is encouraged to call or return for any  issues or concerns.  • Patient was given instructions and counseling regarding his condition or for health maintenance advice. Please see specific information pulled into the AVS if appropriate.     Henry Ashford PA-C   Date of Encounter: 6/30/2021   Electronically signed by Henry Ashford PA-C, 06/30/21, 12:32 PM EDT.     EMR Dragon/Transcription disclaimer:  Much of this encounter note is an electronic transcription/translation of spoken language to printed text. The electronic translation of spoken language may permit erroneous, or at times, nonsensical words or phrases to be inadvertently transcribed; Although I have reviewed the note for such errors, some may still exist.

## 2021-07-06 ENCOUNTER — OFFICE VISIT (OUTPATIENT)
Dept: ORTHOPEDIC SURGERY | Facility: CLINIC | Age: 56
End: 2021-07-06

## 2021-07-06 VITALS — BODY MASS INDEX: 35.93 KG/M2 | TEMPERATURE: 95.7 F | WEIGHT: 279.98 LBS

## 2021-07-06 DIAGNOSIS — M47.22 CERVICAL SPONDYLOSIS WITH RADICULOPATHY: ICD-10-CM

## 2021-07-06 DIAGNOSIS — M50.30 DEGENERATIVE DISC DISEASE, CERVICAL: ICD-10-CM

## 2021-07-06 DIAGNOSIS — M54.12 CERVICAL RADICULOPATHY: Primary | ICD-10-CM

## 2021-07-06 PROCEDURE — 72040 X-RAY EXAM NECK SPINE 2-3 VW: CPT | Performed by: ORTHOPAEDIC SURGERY

## 2021-07-06 PROCEDURE — 99214 OFFICE O/P EST MOD 30 MIN: CPT | Performed by: ORTHOPAEDIC SURGERY

## 2021-07-06 NOTE — PROGRESS NOTES
New patient or new problem visit    CC: Neck pain    HPI: He describes 20 years of periodic flareup with severe neck pain lasting 2 days.  Most recently however he cannot shake it.  The pain radiates to the left and occasionally right arm.  Goes into the shoulder and is worse with activity.  No balance difficulties bowel or bladder complaints he is tried physical therapy chiropractic Medrol pack and stronger steroids.    PFSH: See attached.  He works as an  at [employer] and has a physical job.    ROS: No fever chills or weight loss    PE: On exam mild tenderness to deep palpation.  Good strength in the upper extremities reflexes are subdued but symmetrical.  Benny test is negative.    XRAY: Plain film x-rays of cervical spine show multilevel spondylosis and loss of lordosis.  Disc space narrowing but not a lot of osteophytes.  His MRI was done in [place] I have report thereof showing multiple right and left-sided foraminal stenotic changes from mild to severe    Other:     Impression: Cervical spondylosis with radiculopathy    Plan: I recommended cervical epidural steroid injections for now.  I explained the risk of the procedures.  Also the risk of neurologic deterioration without surgery, which I believe is low.  Hopefully we get this settled down and he can live a few more years before surgery may be needed.  At that point would be looking at myelogram and CT scan for further narrowing of levels.  Follow-up as needed

## 2021-07-13 PROBLEM — M24.111 LABRAL TEAR OF SHOULDER, DEGENERATIVE, RIGHT: Status: ACTIVE | Noted: 2021-07-13

## 2021-07-13 PROBLEM — M19.011 ARTHRITIS OF RIGHT ACROMIOCLAVICULAR JOINT: Status: ACTIVE | Noted: 2021-07-13

## 2021-07-15 ENCOUNTER — PRE-ADMISSION TESTING (OUTPATIENT)
Dept: PREADMISSION TESTING | Facility: HOSPITAL | Age: 56
End: 2021-07-15

## 2021-07-15 VITALS
DIASTOLIC BLOOD PRESSURE: 78 MMHG | SYSTOLIC BLOOD PRESSURE: 130 MMHG | BODY MASS INDEX: 37.6 KG/M2 | TEMPERATURE: 97.8 F | HEART RATE: 108 BPM | WEIGHT: 293 LBS | RESPIRATION RATE: 16 BRPM | HEIGHT: 74 IN | OXYGEN SATURATION: 97 %

## 2021-07-15 DIAGNOSIS — M24.111 LABRAL TEAR OF SHOULDER, DEGENERATIVE, RIGHT: ICD-10-CM

## 2021-07-15 DIAGNOSIS — M75.121 COMPLETE TEAR OF RIGHT ROTATOR CUFF, UNSPECIFIED WHETHER TRAUMATIC: ICD-10-CM

## 2021-07-15 DIAGNOSIS — M19.011 ARTHRITIS OF RIGHT ACROMIOCLAVICULAR JOINT: ICD-10-CM

## 2021-07-15 LAB
ANION GAP SERPL CALCULATED.3IONS-SCNC: 12.4 MMOL/L (ref 5–15)
BILIRUB UR QL STRIP: NEGATIVE
BUN SERPL-MCNC: 18 MG/DL (ref 6–20)
BUN/CREAT SERPL: 16.1 (ref 7–25)
CALCIUM SPEC-SCNC: 10.1 MG/DL (ref 8.6–10.5)
CHLORIDE SERPL-SCNC: 102 MMOL/L (ref 98–107)
CLARITY UR: CLEAR
CO2 SERPL-SCNC: 24.6 MMOL/L (ref 22–29)
COLOR UR: YELLOW
CREAT SERPL-MCNC: 1.12 MG/DL (ref 0.76–1.27)
DEPRECATED RDW RBC AUTO: 47.8 FL (ref 37–54)
ERYTHROCYTE [DISTWIDTH] IN BLOOD BY AUTOMATED COUNT: 13.7 % (ref 12.3–15.4)
GFR SERPL CREATININE-BSD FRML MDRD: 68 ML/MIN/1.73
GLUCOSE SERPL-MCNC: 90 MG/DL (ref 65–99)
GLUCOSE UR STRIP-MCNC: NEGATIVE MG/DL
HCT VFR BLD AUTO: 46.7 % (ref 37.5–51)
HGB BLD-MCNC: 15.6 G/DL (ref 13–17.7)
HGB UR QL STRIP.AUTO: NEGATIVE
KETONES UR QL STRIP: NEGATIVE
LEUKOCYTE ESTERASE UR QL STRIP.AUTO: NEGATIVE
MCH RBC QN AUTO: 31.6 PG (ref 26.6–33)
MCHC RBC AUTO-ENTMCNC: 33.4 G/DL (ref 31.5–35.7)
MCV RBC AUTO: 94.7 FL (ref 79–97)
NITRITE UR QL STRIP: NEGATIVE
PH UR STRIP.AUTO: 6 [PH] (ref 5–8)
PLATELET # BLD AUTO: 227 10*3/MM3 (ref 140–450)
PMV BLD AUTO: 10 FL (ref 6–12)
POTASSIUM SERPL-SCNC: 4.8 MMOL/L (ref 3.5–5.2)
PROT UR QL STRIP: NEGATIVE
QT INTERVAL: 329 MS
RBC # BLD AUTO: 4.93 10*6/MM3 (ref 4.14–5.8)
SARS-COV-2 ORF1AB RESP QL NAA+PROBE: NOT DETECTED
SODIUM SERPL-SCNC: 139 MMOL/L (ref 136–145)
SP GR UR STRIP: 1.01 (ref 1–1.03)
UROBILINOGEN UR QL STRIP: NORMAL
WBC # BLD AUTO: 8.29 10*3/MM3 (ref 3.4–10.8)

## 2021-07-15 PROCEDURE — 93010 ELECTROCARDIOGRAM REPORT: CPT | Performed by: INTERNAL MEDICINE

## 2021-07-15 PROCEDURE — 85027 COMPLETE CBC AUTOMATED: CPT

## 2021-07-15 PROCEDURE — 80048 BASIC METABOLIC PNL TOTAL CA: CPT

## 2021-07-15 PROCEDURE — 36415 COLL VENOUS BLD VENIPUNCTURE: CPT

## 2021-07-15 PROCEDURE — 93005 ELECTROCARDIOGRAM TRACING: CPT

## 2021-07-15 PROCEDURE — U0004 COV-19 TEST NON-CDC HGH THRU: HCPCS

## 2021-07-15 PROCEDURE — C9803 HOPD COVID-19 SPEC COLLECT: HCPCS

## 2021-07-15 PROCEDURE — 81003 URINALYSIS AUTO W/O SCOPE: CPT

## 2021-07-16 ENCOUNTER — ANESTHESIA (OUTPATIENT)
Dept: PERIOP | Facility: HOSPITAL | Age: 56
End: 2021-07-16

## 2021-07-16 ENCOUNTER — ANESTHESIA EVENT (OUTPATIENT)
Dept: PERIOP | Facility: HOSPITAL | Age: 56
End: 2021-07-16

## 2021-07-16 ENCOUNTER — HOSPITAL ENCOUNTER (OUTPATIENT)
Facility: HOSPITAL | Age: 56
Setting detail: HOSPITAL OUTPATIENT SURGERY
Discharge: HOME | End: 2021-07-16
Attending: ORTHOPAEDIC SURGERY | Admitting: ORTHOPAEDIC SURGERY

## 2021-07-16 VITALS
OXYGEN SATURATION: 93 % | SYSTOLIC BLOOD PRESSURE: 127 MMHG | HEART RATE: 82 BPM | RESPIRATION RATE: 18 BRPM | DIASTOLIC BLOOD PRESSURE: 77 MMHG | TEMPERATURE: 97.6 F

## 2021-07-16 DIAGNOSIS — M75.121 NONTRAUMATIC COMPLETE TEAR OF RIGHT ROTATOR CUFF: Primary | ICD-10-CM

## 2021-07-16 DIAGNOSIS — M24.111 LABRAL TEAR OF SHOULDER, DEGENERATIVE, RIGHT: ICD-10-CM

## 2021-07-16 DIAGNOSIS — M75.121 COMPLETE TEAR OF RIGHT ROTATOR CUFF, UNSPECIFIED WHETHER TRAUMATIC: ICD-10-CM

## 2021-07-16 DIAGNOSIS — M19.011 ARTHRITIS OF RIGHT ACROMIOCLAVICULAR JOINT: ICD-10-CM

## 2021-07-16 PROCEDURE — 25010000002 ROPIVACAINE PER 1 MG: Performed by: ORTHOPAEDIC SURGERY

## 2021-07-16 PROCEDURE — 25010000002 FENTANYL CITRATE (PF) 50 MCG/ML SOLUTION: Performed by: ANESTHESIOLOGY

## 2021-07-16 PROCEDURE — 76942 ECHO GUIDE FOR BIOPSY: CPT | Performed by: ORTHOPAEDIC SURGERY

## 2021-07-16 PROCEDURE — S0260 H&P FOR SURGERY: HCPCS | Performed by: ORTHOPAEDIC SURGERY

## 2021-07-16 PROCEDURE — 25010000002 EPINEPHRINE PER 0.1 MG: Performed by: ORTHOPAEDIC SURGERY

## 2021-07-16 PROCEDURE — 23412 REPAIR ROTATOR CUFF CHRONIC: CPT | Performed by: ORTHOPAEDIC SURGERY

## 2021-07-16 PROCEDURE — 25010000003 BUPIVACAINE LIPOSOME 1.3 % SUSPENSION: Performed by: ORTHOPAEDIC SURGERY

## 2021-07-16 PROCEDURE — 25010000002 ROPIVACAINE PER 1 MG: Performed by: ANESTHESIOLOGY

## 2021-07-16 PROCEDURE — 29824 SHO ARTHRS SRG DSTL CLAVICLC: CPT | Performed by: ORTHOPAEDIC SURGERY

## 2021-07-16 PROCEDURE — L3670 SO ACRO/CLAV CAN WEB PRE OTS: HCPCS | Performed by: ORTHOPAEDIC SURGERY

## 2021-07-16 PROCEDURE — C9290 INJ, BUPIVACAINE LIPOSOME: HCPCS | Performed by: ORTHOPAEDIC SURGERY

## 2021-07-16 PROCEDURE — 25010000002 PROPOFOL 10 MG/ML EMULSION: Performed by: NURSE ANESTHETIST, CERTIFIED REGISTERED

## 2021-07-16 PROCEDURE — 25010000002 FENTANYL CITRATE (PF) 50 MCG/ML SOLUTION: Performed by: NURSE ANESTHETIST, CERTIFIED REGISTERED

## 2021-07-16 PROCEDURE — 25010000002 MIDAZOLAM PER 1 MG: Performed by: ANESTHESIOLOGY

## 2021-07-16 PROCEDURE — 25010000002 DEXAMETHASONE PER 1 MG: Performed by: ANESTHESIOLOGY

## 2021-07-16 PROCEDURE — 25010000002 ONDANSETRON PER 1 MG: Performed by: NURSE ANESTHETIST, CERTIFIED REGISTERED

## 2021-07-16 PROCEDURE — 25010000002 NEOSTIGMINE 5 MG/10ML SOLUTION: Performed by: NURSE ANESTHETIST, CERTIFIED REGISTERED

## 2021-07-16 PROCEDURE — 25010000002 DEXAMETHASONE PER 1 MG: Performed by: NURSE ANESTHETIST, CERTIFIED REGISTERED

## 2021-07-16 PROCEDURE — C1713 ANCHOR/SCREW BN/BN,TIS/BN: HCPCS | Performed by: ORTHOPAEDIC SURGERY

## 2021-07-16 DEVICE — SUT/ANCH VENTIX PEEK KNOTLSS 4X7.5MM: Type: IMPLANTABLE DEVICE | Site: SHOULDER | Status: FUNCTIONAL

## 2021-07-16 DEVICE — SUT NONABS MAXBRAID/PE NMBR2 HC5 38IN BLU 900334: Type: IMPLANTABLE DEVICE | Site: SHOULDER | Status: FUNCTIONAL

## 2021-07-16 RX ORDER — LIDOCAINE HYDROCHLORIDE 10 MG/ML
0.5 INJECTION, SOLUTION EPIDURAL; INFILTRATION; INTRACAUDAL; PERINEURAL ONCE AS NEEDED
Status: DISCONTINUED | OUTPATIENT
Start: 2021-07-16 | End: 2021-07-16 | Stop reason: HOSPADM

## 2021-07-16 RX ORDER — FLUMAZENIL 0.1 MG/ML
0.2 INJECTION INTRAVENOUS AS NEEDED
Status: DISCONTINUED | OUTPATIENT
Start: 2021-07-16 | End: 2021-07-16 | Stop reason: HOSPADM

## 2021-07-16 RX ORDER — CEFAZOLIN SODIUM IN 0.9 % NACL 3 G/100 ML
3 INTRAVENOUS SOLUTION, PIGGYBACK (ML) INTRAVENOUS ONCE
Status: COMPLETED | OUTPATIENT
Start: 2021-07-16 | End: 2021-07-16

## 2021-07-16 RX ORDER — ROPIVACAINE HYDROCHLORIDE 2 MG/ML
INJECTION, SOLUTION EPIDURAL; INFILTRATION; PERINEURAL
Status: COMPLETED | OUTPATIENT
Start: 2021-07-16 | End: 2021-07-16

## 2021-07-16 RX ORDER — OXYCODONE HYDROCHLORIDE AND ACETAMINOPHEN 5; 325 MG/1; MG/1
1 TABLET ORAL ONCE AS NEEDED
Status: DISCONTINUED | OUTPATIENT
Start: 2021-07-16 | End: 2021-07-16 | Stop reason: HOSPADM

## 2021-07-16 RX ORDER — ASPIRIN 81 MG/1
325 TABLET ORAL DAILY
Qty: 30 TABLET | Refills: 0 | Status: SHIPPED | OUTPATIENT
Start: 2021-07-16 | End: 2021-08-10

## 2021-07-16 RX ORDER — ROPIVACAINE HYDROCHLORIDE 5 MG/ML
INJECTION, SOLUTION EPIDURAL; INFILTRATION; PERINEURAL
Status: COMPLETED | OUTPATIENT
Start: 2021-07-16 | End: 2021-07-16

## 2021-07-16 RX ORDER — SODIUM CHLORIDE, SODIUM LACTATE, POTASSIUM CHLORIDE, CALCIUM CHLORIDE 600; 310; 30; 20 MG/100ML; MG/100ML; MG/100ML; MG/100ML
9 INJECTION, SOLUTION INTRAVENOUS CONTINUOUS
Status: DISCONTINUED | OUTPATIENT
Start: 2021-07-16 | End: 2021-07-16 | Stop reason: HOSPADM

## 2021-07-16 RX ORDER — FENTANYL CITRATE 50 UG/ML
100 INJECTION, SOLUTION INTRAMUSCULAR; INTRAVENOUS
Status: DISCONTINUED | OUTPATIENT
Start: 2021-07-16 | End: 2021-07-16 | Stop reason: HOSPADM

## 2021-07-16 RX ORDER — SODIUM CHLORIDE 0.9 % (FLUSH) 0.9 %
3 SYRINGE (ML) INJECTION EVERY 12 HOURS SCHEDULED
Status: DISCONTINUED | OUTPATIENT
Start: 2021-07-16 | End: 2021-07-16 | Stop reason: HOSPADM

## 2021-07-16 RX ORDER — HYDROMORPHONE HYDROCHLORIDE 1 MG/ML
0.5 INJECTION, SOLUTION INTRAMUSCULAR; INTRAVENOUS; SUBCUTANEOUS
Status: DISCONTINUED | OUTPATIENT
Start: 2021-07-16 | End: 2021-07-16 | Stop reason: HOSPADM

## 2021-07-16 RX ORDER — DEXAMETHASONE SODIUM PHOSPHATE 4 MG/ML
INJECTION, SOLUTION INTRA-ARTICULAR; INTRALESIONAL; INTRAMUSCULAR; INTRAVENOUS; SOFT TISSUE
Status: COMPLETED | OUTPATIENT
Start: 2021-07-16 | End: 2021-07-16

## 2021-07-16 RX ORDER — ROPIVACAINE HYDROCHLORIDE 5 MG/ML
INJECTION, SOLUTION EPIDURAL; INFILTRATION; PERINEURAL AS NEEDED
Status: DISCONTINUED | OUTPATIENT
Start: 2021-07-16 | End: 2021-07-16 | Stop reason: HOSPADM

## 2021-07-16 RX ORDER — PROPOFOL 10 MG/ML
VIAL (ML) INTRAVENOUS AS NEEDED
Status: DISCONTINUED | OUTPATIENT
Start: 2021-07-16 | End: 2021-07-16 | Stop reason: SURG

## 2021-07-16 RX ORDER — ONDANSETRON 2 MG/ML
4 INJECTION INTRAMUSCULAR; INTRAVENOUS ONCE AS NEEDED
Status: DISCONTINUED | OUTPATIENT
Start: 2021-07-16 | End: 2021-07-16 | Stop reason: HOSPADM

## 2021-07-16 RX ORDER — HYDROCODONE BITARTRATE AND ACETAMINOPHEN 7.5; 325 MG/1; MG/1
1 TABLET ORAL EVERY 4 HOURS PRN
Status: DISCONTINUED | OUTPATIENT
Start: 2021-07-16 | End: 2021-07-16 | Stop reason: HOSPADM

## 2021-07-16 RX ORDER — CLINDAMYCIN PHOSPHATE 900 MG/50ML
900 INJECTION INTRAVENOUS ONCE
Status: DISCONTINUED | OUTPATIENT
Start: 2021-07-16 | End: 2021-07-16

## 2021-07-16 RX ORDER — ONDANSETRON 2 MG/ML
INJECTION INTRAMUSCULAR; INTRAVENOUS AS NEEDED
Status: DISCONTINUED | OUTPATIENT
Start: 2021-07-16 | End: 2021-07-16 | Stop reason: SURG

## 2021-07-16 RX ORDER — FENTANYL CITRATE 50 UG/ML
50 INJECTION, SOLUTION INTRAMUSCULAR; INTRAVENOUS
Status: DISCONTINUED | OUTPATIENT
Start: 2021-07-16 | End: 2021-07-16 | Stop reason: HOSPADM

## 2021-07-16 RX ORDER — DEXAMETHASONE SODIUM PHOSPHATE 10 MG/ML
INJECTION INTRAMUSCULAR; INTRAVENOUS AS NEEDED
Status: DISCONTINUED | OUTPATIENT
Start: 2021-07-16 | End: 2021-07-16 | Stop reason: SURG

## 2021-07-16 RX ORDER — SODIUM CHLORIDE 0.9 % (FLUSH) 0.9 %
3-10 SYRINGE (ML) INJECTION AS NEEDED
Status: DISCONTINUED | OUTPATIENT
Start: 2021-07-16 | End: 2021-07-16 | Stop reason: HOSPADM

## 2021-07-16 RX ORDER — ROCURONIUM BROMIDE 10 MG/ML
INJECTION, SOLUTION INTRAVENOUS AS NEEDED
Status: DISCONTINUED | OUTPATIENT
Start: 2021-07-16 | End: 2021-07-16 | Stop reason: SURG

## 2021-07-16 RX ORDER — MIDAZOLAM HYDROCHLORIDE 1 MG/ML
1 INJECTION INTRAMUSCULAR; INTRAVENOUS
Status: DISCONTINUED | OUTPATIENT
Start: 2021-07-16 | End: 2021-07-16 | Stop reason: HOSPADM

## 2021-07-16 RX ORDER — EPHEDRINE SULFATE 50 MG/ML
5 INJECTION, SOLUTION INTRAVENOUS ONCE AS NEEDED
Status: DISCONTINUED | OUTPATIENT
Start: 2021-07-16 | End: 2021-07-16 | Stop reason: HOSPADM

## 2021-07-16 RX ORDER — LIDOCAINE HYDROCHLORIDE 20 MG/ML
INJECTION, SOLUTION INFILTRATION; PERINEURAL AS NEEDED
Status: DISCONTINUED | OUTPATIENT
Start: 2021-07-16 | End: 2021-07-16 | Stop reason: SURG

## 2021-07-16 RX ORDER — OXYCODONE AND ACETAMINOPHEN 7.5; 325 MG/1; MG/1
1 TABLET ORAL EVERY 4 HOURS PRN
Qty: 40 TABLET | Refills: 0 | Status: SHIPPED | OUTPATIENT
Start: 2021-07-16 | End: 2021-07-22 | Stop reason: SDUPTHER

## 2021-07-16 RX ORDER — FENTANYL CITRATE 50 UG/ML
INJECTION, SOLUTION INTRAMUSCULAR; INTRAVENOUS AS NEEDED
Status: DISCONTINUED | OUTPATIENT
Start: 2021-07-16 | End: 2021-07-16 | Stop reason: SURG

## 2021-07-16 RX ORDER — GLYCOPYRROLATE 0.2 MG/ML
INJECTION INTRAMUSCULAR; INTRAVENOUS AS NEEDED
Status: DISCONTINUED | OUTPATIENT
Start: 2021-07-16 | End: 2021-07-16 | Stop reason: SURG

## 2021-07-16 RX ORDER — HYDROCODONE BITARTRATE AND ACETAMINOPHEN 5; 325 MG/1; MG/1
1 TABLET ORAL ONCE AS NEEDED
Status: DISCONTINUED | OUTPATIENT
Start: 2021-07-16 | End: 2021-07-16 | Stop reason: HOSPADM

## 2021-07-16 RX ORDER — NEOSTIGMINE METHYLSULFATE 0.5 MG/ML
INJECTION, SOLUTION INTRAVENOUS AS NEEDED
Status: DISCONTINUED | OUTPATIENT
Start: 2021-07-16 | End: 2021-07-16 | Stop reason: SURG

## 2021-07-16 RX ORDER — DOCUSATE SODIUM 100 MG/1
100 CAPSULE, LIQUID FILLED ORAL 2 TIMES DAILY
Qty: 60 CAPSULE | Refills: 0 | Status: SHIPPED | OUTPATIENT
Start: 2021-07-16 | End: 2021-12-16

## 2021-07-16 RX ADMIN — FENTANYL CITRATE 50 MCG: 50 INJECTION INTRAMUSCULAR; INTRAVENOUS at 07:08

## 2021-07-16 RX ADMIN — PROPOFOL 200 MG: 10 INJECTION, EMULSION INTRAVENOUS at 07:08

## 2021-07-16 RX ADMIN — DEXAMETHASONE SODIUM PHOSPHATE 8 MG: 10 INJECTION INTRAMUSCULAR; INTRAVENOUS at 07:13

## 2021-07-16 RX ADMIN — CEFAZOLIN 3 G: 1 INJECTION, POWDER, FOR SOLUTION INTRAMUSCULAR; INTRAVENOUS; PARENTERAL at 07:05

## 2021-07-16 RX ADMIN — FENTANYL CITRATE 25 MCG: 50 INJECTION INTRAMUSCULAR; INTRAVENOUS at 08:51

## 2021-07-16 RX ADMIN — GLYCOPYRROLATE 0.4 MG: 0.2 INJECTION INTRAMUSCULAR; INTRAVENOUS at 08:25

## 2021-07-16 RX ADMIN — ONDANSETRON 4 MG: 2 INJECTION INTRAMUSCULAR; INTRAVENOUS at 08:17

## 2021-07-16 RX ADMIN — ROCURONIUM BROMIDE 40 MG: 50 INJECTION INTRAVENOUS at 07:08

## 2021-07-16 RX ADMIN — LIDOCAINE HYDROCHLORIDE 100 MG: 20 INJECTION, SOLUTION INFILTRATION; PERINEURAL at 07:08

## 2021-07-16 RX ADMIN — FENTANYL CITRATE 50 MCG: 50 INJECTION, SOLUTION INTRAMUSCULAR; INTRAVENOUS at 06:15

## 2021-07-16 RX ADMIN — FENTANYL CITRATE 25 MCG: 50 INJECTION INTRAMUSCULAR; INTRAVENOUS at 08:47

## 2021-07-16 RX ADMIN — ROPIVACAINE HYDROCHLORIDE 10 ML: 2 INJECTION, SOLUTION EPIDURAL; INFILTRATION at 06:26

## 2021-07-16 RX ADMIN — NEOSTIGMINE METHYLSULFATE 3 MG: 0.5 INJECTION INTRAVENOUS at 08:25

## 2021-07-16 RX ADMIN — MIDAZOLAM 2 MG: 1 INJECTION INTRAMUSCULAR; INTRAVENOUS at 06:15

## 2021-07-16 RX ADMIN — DEXAMETHASONE SODIUM PHOSPHATE 4 MG: 4 INJECTION, SOLUTION INTRAMUSCULAR; INTRAVENOUS at 06:26

## 2021-07-16 RX ADMIN — ROPIVACAINE HYDROCHLORIDE 20 ML: 5 INJECTION, SOLUTION EPIDURAL; INFILTRATION; PERINEURAL at 06:26

## 2021-07-16 RX ADMIN — SODIUM CHLORIDE, POTASSIUM CHLORIDE, SODIUM LACTATE AND CALCIUM CHLORIDE 9 ML/HR: 600; 310; 30; 20 INJECTION, SOLUTION INTRAVENOUS at 05:59

## 2021-07-16 NOTE — ANESTHESIA POSTPROCEDURE EVALUATION
Patient: LALO Morris    Procedure Summary     Date: 07/16/21 Room / Location: [facility]  /  [facility]    Anesthesia Start: 0700 Anesthesia Stop: 0914    Procedure: SHOULDER ARTHROSCOPY WITH ROTATOR CUFF REPAIR, labral tear debridement and edgard (Right Shoulder) Diagnosis:       Complete tear of right rotator cuff, unspecified whether traumatic      Arthritis of right acromioclavicular joint      Labral tear of shoulder, degenerative, right      (Complete tear of right rotator cuff, unspecified whether traumatic [M75.121])      (Arthritis of right acromioclavicular joint [M19.011])      (Labral tear of shoulder, degenerative, right [M24.111])    Surgeons: Dandre Titus MD Provider: Pasquale Montgomery MD    Anesthesia Type: general with block ASA Status: 3          Anesthesia Type: general with block    Vitals  Vitals Value Taken Time   /73 07/16/21 1000   Temp 36.4 °C (97.6 °F) 07/16/21 1014   Pulse 85 07/16/21 1014   Resp 20 07/16/21 1014   SpO2 93 % 07/16/21 1014           Post Anesthesia Care and Evaluation    Patient location during evaluation: bedside  Patient participation: complete - patient participated  Level of consciousness: awake  Pain management: adequate  Airway patency: patent  Anesthetic complications: No anesthetic complications  PONV Status: controlled  Cardiovascular status: acceptable  Respiratory status: acceptable  Hydration status: acceptable    Comments: ---------------------------               07/16/21                      1014         ---------------------------   BP:                         Pulse:         85           Resp:          20           Temp:   36.4 °C (97.6 °F)   SpO2:          93%         ---------------------------

## 2021-07-16 NOTE — ANESTHESIA PROCEDURE NOTES
Airway  Urgency: elective    Date/Time: 7/16/2021 7:11 AM  Airway not difficult    General Information and Staff    Patient location during procedure: OR  Anesthesiologist: Pasquale Montgomery MD  CRNA: Annalisa Marsh CRNA    Indications and Patient Condition  Indications for airway management: airway protection    Preoxygenated: yes  MILS not maintained throughout  Mask difficulty assessment: 2 - vent by mask + OA or adjuvant +/- NMBA    Final Airway Details  Final airway type: endotracheal airway      Successful airway: ETT  Cuffed: yes   Successful intubation technique: direct laryngoscopy  Facilitating devices/methods: intubating stylet and anterior pressure/BURP  Blade: Wood  Blade size: 4  ETT size (mm): 7.5  Cormack-Lehane Classification: grade IIa - partial view of glottis  Placement verified by: chest auscultation and capnometry   Measured from: lips  ETT/EBT  to lips (cm): 23  Number of attempts at approach: 1  Assessment: lips, teeth, and gum same as pre-op and atraumatic intubation

## 2021-07-16 NOTE — ANESTHESIA PREPROCEDURE EVALUATION
Anesthesia Evaluation     Patient summary reviewed and Nursing notes reviewed   NPO Solid Status: > 8 hours             Airway   Mallampati: I  TM distance: <3 FB  Neck ROM: full  no difficulty expected  Dental - normal exam     Pulmonary - normal exam   (+) a smoker Current, sleep apnea,   Cardiovascular - normal exam  Exercise tolerance: good (4-7 METS)    (+) hypertension, hyperlipidemia,       Neuro/Psych- negative ROS  GI/Hepatic/Renal/Endo    (+) obesity,     (-) morbid obesity    Musculoskeletal (-) negative ROS    Abdominal  - normal exam    Bowel sounds: normal.   Substance History - negative use     OB/GYN negative ob/gyn ROS         Other                          Anesthesia Plan    ASA 3     general with block     intravenous induction     Anesthetic plan, all risks, benefits, and alternatives have been provided, discussed and informed consent has been obtained with: patient.

## 2021-07-16 NOTE — ANESTHESIA PROCEDURE NOTES
Peripheral Block    Pre-sedation assessment completed: 7/16/2021 6:16 AM    Patient reassessed immediately prior to procedure    Patient location during procedure: pre-op  Start time: 7/16/2021 6:16 AM  Stop time: 7/16/2021 6:26 AM  Reason for block: at surgeon's request and post-op pain management  Performed by  Anesthesiologist: Pasquale Montgomery MD  Preanesthetic Checklist  Completed: patient identified, IV checked, site marked, risks and benefits discussed, surgical consent, monitors and equipment checked, pre-op evaluation and timeout performed  Prep:  Pt Position: supine  Sterile barriers:cap, gloves, mask and sterile barriers  Prep: ChloraPrep  Patient monitoring: blood pressure monitoring, continuous pulse oximetry and EKG  Procedure  Sedation:yes  Performed under: local infiltration  Guidance:ultrasound guided  ULTRASOUND INTERPRETATION.  Using ultrasound guidance a 22 G gauge needle was placed in close proximity to the brachial plexus nerve, at which point, under ultrasound guidance anesthetic was injected in the area of the nerve and spread of the anesthesia was seen on ultrasound in close proximity thereto.  There were no abnormalities seen on ultrasound; a digital image was taken; and the patient tolerated the procedure with no complications. Images:still images obtained    Laterality:right  Block Type:interscalene  Injection Technique:single-shot  Needle Type:echogenic  Needle Gauge:22 G  Resistance on Injection: less than 15 psi    Medications Used: dexamethasone (DECADRON) injection, 4 mg  ropivacaine (NAROPIN) 0.5 % injection, 20 mL  ropivacaine (NAROPIN) 0.2 % injection, 10 mL  Med admintered at 7/16/2021 6:26 AM      Post Assessment  Injection Assessment: negative aspiration for heme, no paresthesia on injection and incremental injection  Patient Tolerance:comfortable throughout block  Complications:no

## 2021-07-22 ENCOUNTER — OFFICE VISIT (OUTPATIENT)
Dept: ORTHOPEDIC SURGERY | Facility: CLINIC | Age: 56
End: 2021-07-22

## 2021-07-22 ENCOUNTER — HOSPITAL ENCOUNTER (OUTPATIENT)
Dept: PAIN MEDICINE | Facility: HOSPITAL | Age: 56
Discharge: HOME | End: 2021-07-22

## 2021-07-22 VITALS — TEMPERATURE: 96.8 F | HEIGHT: 74 IN | BODY MASS INDEX: 37.22 KG/M2 | WEIGHT: 290 LBS

## 2021-07-22 DIAGNOSIS — Z98.890 STATUS POST ARTHROSCOPY OF RIGHT SHOULDER: Primary | ICD-10-CM

## 2021-07-22 DIAGNOSIS — G89.29 CHRONIC LEFT SHOULDER PAIN: ICD-10-CM

## 2021-07-22 DIAGNOSIS — M75.121 NONTRAUMATIC COMPLETE TEAR OF RIGHT ROTATOR CUFF: ICD-10-CM

## 2021-07-22 DIAGNOSIS — M25.512 CHRONIC LEFT SHOULDER PAIN: ICD-10-CM

## 2021-07-22 DIAGNOSIS — M62.838 MUSCLE SPASM: ICD-10-CM

## 2021-07-22 DIAGNOSIS — M54.12 CERVICAL RADICULOPATHY: ICD-10-CM

## 2021-07-22 PROCEDURE — 99024 POSTOP FOLLOW-UP VISIT: CPT | Performed by: ORTHOPAEDIC SURGERY

## 2021-07-22 RX ORDER — OXYCODONE AND ACETAMINOPHEN 7.5; 325 MG/1; MG/1
1 TABLET ORAL EVERY 12 HOURS PRN
Qty: 40 TABLET | Refills: 0 | Status: SHIPPED | OUTPATIENT
Start: 2021-07-22 | End: 2021-08-19

## 2021-07-22 RX ORDER — CYCLOBENZAPRINE HCL 10 MG
10 TABLET ORAL 3 TIMES DAILY PRN
Qty: 30 TABLET | Refills: 1 | Status: SHIPPED | OUTPATIENT
Start: 2021-07-22 | End: 2021-12-16

## 2021-07-22 NOTE — PROGRESS NOTES
OTHER POST OP GLOBAL     NAME: LALO Morris  ?  : 1965  ?  MRN: [id number]    Chief Complaint   Patient presents with   • Right Shoulder - Follow-up, Post-op   • Wound Check     ?  Date of surgery: 2021    HPI:   Patient returns today for 6 day follow up of right rotator cuff repair. Arthroscopic portals healing nicely/as expected with no signs of infection. Patient reports doing well with no unusual complaints. Appears to be progressing appropriately.  His pain is fairly well controlled.  We have discussed the arthroscopic pictures with the patient and his wife in detail in the office.      Ortho Exam:   Right shoulder. The patient is status-post rotator cuff repair 6 day(s) . Incision is clean and healing well. Arthroscopic portals are clean. Appropriate amounts of swelling and bruising are noted. The patients pain is well controlled. The passive range of motion is abduction 0-30 degrees, flexion 0-30 degrees. Axillary nerve function is well preserved. Radial artery pulses are palpable.      Diagnostic Studies:  no diagnostic testing performed this visit      Assessment:  Diagnoses and all orders for this visit:    1. Status post arthroscopy of right shoulder (Primary)  -     Ambulatory Referral to Physical Therapy Evaluate and treat, POST OP          Plan   • Incision care  • Continue ice as needed  • No active ROM, only passive ROM  • Stretching and strengthening exercises  • Alternate Ibuprofen and Tylenol as needed  • Fall precautions  • Follow up in 4 week(s)     Date of encounter: 2021  Dandre Titus MD                 Answers for HPI/ROS submitted by the patient on 2021  What is the primary reason for your visit?: Other  Please describe your symptoms.: followup to surgery  Have you had these symptoms before?: No  How long have you been having these symptoms?: Greater than 2 weeks  Please list any medications you are currently taking for this condition.: percocet    aspirin  Please  describe any probable cause for these symptoms. : .

## 2021-07-23 PROBLEM — Z98.890 STATUS POST ARTHROSCOPY OF RIGHT SHOULDER: Status: ACTIVE | Noted: 2021-07-23

## 2021-07-26 ENCOUNTER — TELEPHONE (OUTPATIENT)
Dept: ORTHOPEDIC SURGERY | Facility: CLINIC | Age: 56
End: 2021-07-26

## 2021-07-28 ENCOUNTER — ANESTHESIA EVENT (OUTPATIENT)
Dept: PAIN MEDICINE | Facility: HOSPITAL | Age: 56
End: 2021-07-28

## 2021-07-28 ENCOUNTER — ANESTHESIA (OUTPATIENT)
Dept: PAIN MEDICINE | Facility: HOSPITAL | Age: 56
End: 2021-07-28

## 2021-07-28 ENCOUNTER — HOSPITAL ENCOUNTER (OUTPATIENT)
Dept: GENERAL RADIOLOGY | Facility: HOSPITAL | Age: 56
Discharge: HOME | End: 2021-07-28

## 2021-07-28 ENCOUNTER — HOSPITAL ENCOUNTER (OUTPATIENT)
Dept: PAIN MEDICINE | Facility: HOSPITAL | Age: 56
Discharge: HOME | End: 2021-07-28

## 2021-07-28 VITALS
RESPIRATION RATE: 18 BRPM | WEIGHT: 285 LBS | SYSTOLIC BLOOD PRESSURE: 149 MMHG | HEIGHT: 74 IN | BODY MASS INDEX: 36.57 KG/M2 | TEMPERATURE: 98.4 F | HEART RATE: 96 BPM | OXYGEN SATURATION: 95 % | DIASTOLIC BLOOD PRESSURE: 84 MMHG

## 2021-07-28 DIAGNOSIS — R52 PAIN: ICD-10-CM

## 2021-07-28 DIAGNOSIS — M50.30 DEGENERATIVE DISC DISEASE, CERVICAL: Primary | ICD-10-CM

## 2021-07-28 PROCEDURE — 25010000002 DEXAMETHASONE SODIUM PHOSPHATE 10 MG/ML SOLUTION: Performed by: ANESTHESIOLOGY

## 2021-07-28 PROCEDURE — 77003 FLUOROGUIDE FOR SPINE INJECT: CPT

## 2021-07-28 PROCEDURE — 0 IOPAMIDOL 41 % SOLUTION: Performed by: ANESTHESIOLOGY

## 2021-07-28 RX ORDER — DEXAMETHASONE SODIUM PHOSPHATE 10 MG/ML
10 INJECTION, SOLUTION INTRAMUSCULAR; INTRAVENOUS ONCE
Status: COMPLETED | OUTPATIENT
Start: 2021-07-28 | End: 2021-07-28

## 2021-07-28 RX ADMIN — IOPAMIDOL 10 ML: 408 INJECTION, SOLUTION INTRATHECAL at 10:30

## 2021-07-28 RX ADMIN — DEXAMETHASONE SODIUM PHOSPHATE 10 MG: 10 INJECTION, SOLUTION INTRAMUSCULAR; INTRAVENOUS at 10:35

## 2021-07-28 NOTE — H&P
[facility]    History and Physical    Patient Name: LALO Morris  :  1965  MRN:  [id number]  Date of Admission: 2021    Subjective     Patient is a 56 y.o. male presents with chief complaint of chronic, constant, severe neck and left shoulder pain.  Onset of symptoms was gradual starting 6 months ago.  Symptoms are associated/aggravated by exercise, lifting, straining or twisting. Symptoms improve with ice and heating pad.  On a pain scale from 0-10, he rates his pain as a four while at rest and a 10 with activity.  He describes the pain as sharp, stabbing and throbbing in nature.  He was referred to the pain clinic for a series of cervical epidural steroid injections.    His most recent cervical MRI shows multilevel degenerative disc disease and spinal stenosis.    The following portions of the patients history were reviewed and updated as appropriate: current medications, allergies, past medical history, past surgical history, past family history, past social history and problem list                Objective     Past Medical History:   Past Medical History:   Diagnosis Date   • Acromioclavicular separation left1984   • Ankle sprain left,    • Arthritis    • DDD (degenerative disc disease), cervical    • Deviated septum    • Dislocation, shoulder left,    • Elevated cholesterol     NO MEDS   • Fracture of wrist left,    • Frozen shoulder 3 months ago sporadic   • Hypertension    • Knee sprain left,    • Low back strain    • Lumbosacral disc disease 20 yrs  + -   • Occult blood in stools    • Rotator cuff syndrome just had surgery   • Sleep apnea     CPAP   • Stress fracture    • Tendinitis of knee    • Torn rotator cuff    • Wrist sprain      Past Surgical History:   Past Surgical History:   Procedure Laterality Date   • BICEPS TENDON REPAIR Right      approx   • COLONOSCOPY N/A 2018    Procedure: COLONOSCOPY into left colon with biopsies;  Surgeon: Robles Gan  MD John;  Location: [facility] ENDOSCOPY;  Service: Gastroenterology   • COLONOSCOPY N/A 5/24/2018    Procedure: COLONOSCOPY into cecum with saline lift, hot snare polypectomy, clip;  Surgeon: Robles Lopez MD;  Location: [facility] ENDOSCOPY;  Service: Gastroenterology   • SHOULDER ARTHROSCOPY W/ ROTATOR CUFF REPAIR Right 7/16/2021    Procedure: SHOULDER ARTHROSCOPY WITH ROTATOR CUFF REPAIR, labral tear debridement and edgard;  Surgeon: Dander Titus MD;  Location:  [facility];  Service: Orthopedics;  Laterality: Right;   • SHOULDER SURGERY  7-16-21     Family History:   Family History   Problem Relation Age of Onset   • Lung cancer Father    • Kidney disease Father    • Cancer Father    • Kidney disease Brother    • Cancer Paternal Aunt    • Osteoporosis Maternal Grandmother    • Malig Hyperthermia Neg Hx      Social History:   Social History     Socioeconomic History   • Marital status:      Spouse name: Not on file   • Number of children: Not on file   • Years of education: Not on file   • Highest education level: Not on file   Tobacco Use   • Smoking status: Current Every Day Smoker     Packs/day: 1.00     Years: 25.00     Pack years: 25.00     Types: Cigarettes     Start date: 6/1/1985   • Smokeless tobacco: Never Used   • Tobacco comment: quit a bunch of times from a week to several months, never seem to be able to stay with it   Vaping Use   • Vaping Use: Never used   Substance and Sexual Activity   • Alcohol use: Yes     Alcohol/week: 1.0 standard drinks     Types: 1 Standard drinks or equivalent per week     Comment: really if its averaged out its two a month   • Drug use: No   • Sexual activity: Yes     Partners: Female     Birth control/protection: None       Vital Signs Range for the last 24 hours  Temperature: Temp:  [36.9 °C (98.4 °F)] 36.9 °C (98.4 °F)   Temp Source: Temp src: Infrared   BP: BP: (145)/(81) 145/81   Pulse: Heart Rate:  [99] 99   Respirations: Resp:  [18] 18   SPO2:  "SpO2:  [95 %] 95 %   O2 Amount (l/min):     O2 Devices Device (Oxygen Therapy): room air   Weight: Weight:  [129 kg (285 lb)] 129 kg (285 lb)     Flowsheet Rows      First Filed Value   Admission Height  188 cm (74\") Documented at 07/28/2021 0959   Admission Weight  129 kg (285 lb) Documented at 07/28/2021 0959          --------------------------------------------------------------------------------    Current Outpatient Medications   Medication Sig Dispense Refill   • acetaminophen (TYLENOL) 500 MG tablet Take 500 mg by mouth Daily As Needed for Mild Pain .     • aspirin 81 MG EC tablet Take 4 tablets by mouth Daily. 30 tablet 0   • cyclobenzaprine (FLEXERIL) 10 MG tablet Take 1 tablet by mouth 3 (Three) Times a Day As Needed for Muscle Spasms. 30 tablet 1   • Doxylamine Succinate, Sleep, (SLEEP AID PO) Take 1 tablet by mouth At Night As Needed.     • ibuprofen (Advil) 200 MG tablet Take 200 mg by mouth Daily As Needed for Mild Pain . PT TO STOP PER MD INSTRUCTION     • lisinopril (PRINIVIL,ZESTRIL) 10 MG tablet TAKE ONE TABLET BY MOUTH DAILY (Patient taking differently: Take 10 mg by mouth Daily.) 30 tablet 11   • oxyCODONE-acetaminophen (PERCOCET) 7.5-325 MG per tablet Take 1 tablet by mouth Every 12 (Twelve) Hours As Needed for Moderate Pain . Indications: S/P ROTATOR CUFF REPAIR 40 tablet 0   • docusate sodium (COLACE) 100 MG capsule Take 1 capsule by mouth 2 (Two) Times a Day. 60 capsule 0     No current facility-administered medications for this encounter.       --------------------------------------------------------------------------------  Assessment/Plan      Anesthesia Evaluation     Patient summary reviewed and Nursing notes reviewed   NPO Solid Status: > 8 hours  NPO Liquid Status: > 2 hours    Pain impairs ability to perform ADLs: Dressing, Housekeeping, Ambulation, Driving, Working, Exercise/Activity and Sleeping  Modalities previously tried to control pain with limited effectiveness within the last " 4-6 weeks: Ice, Rest, Heat, OTC medications, Prescription medications and Physical therapy     Airway   Mallampati: II  TM distance: >3 FB  Neck ROM: full  Dental - normal exam     Pulmonary - normal exam    breath sounds clear to auscultation  (+) sleep apnea,   Cardiovascular - normal exam    Rhythm: regular  Rate: normal    (+) hypertension, hyperlipidemia,   (-) angina, orthopnea, PND, DASILVA      Neuro/Psych- negative ROS  GI/Hepatic/Renal/Endo    (+) obesity,       Musculoskeletal     Abdominal    Substance History - negative use     OB/GYN negative ob/gyn ROS         Other   arthritis,                 Diagnosis and Plan    Treatment Plan  ASA 3      Procedures: Cervical Epidural Steroid Injection(MIKHAIL), With fluoroscopy,       Anesthetic plan and risks discussed with patient.        Alternative management options include physical therapy, medical management with nonsteroidal anti-inflammatory medications or narcotics, chiropractic manipulation and surgical intervention.    1.  Cervical epidural steroid injections, up to 3, spaced 1-2 weeks apart.  If pain control is acceptable after 1 or 2 injections, it was discussed with the patient that they may return for the subsequent injections if and when their pain returns.  The risks were discussed with the patient including failure of relief, worsening pain, Headache (post dural puncture headache), bleeding (epidural hematoma) and infection (epidural abscess or skin infection).  2.  Physical therapy exercises at home as prescribed by physical therapy or from the pain clinic handout (given to the patient).  Continuation of these exercises every day, or multiple times per week, even when the patient has good pain relief, was stressed to the patient as a preventative measure to decrease the frequency and severity of future pain episodes.  3.  Continue pain medicines as already prescribed.  If patient not currently taking any, it is recommended to begin Acetaminophen 1000  mg po q 8 hours.  If other medicines containing Acetaminophen are currently prescribed, maintain daily dose at 3000 mg.    4.  If they can tolerate NSAIDS, it is recommended to take Ibuprofen 600 mg po q 6 hours for 7 days during pain exacerbations.  Alternatively, they may substitute an NSAID of their choice (e.g. Aleve).  This may be taken at the same time as Acetaminophen.  5.  Heat and ice to the affected area as tolerated for pain control.  It was discussed that heating pads can cause burns.  6.  Daily low impact exercise such as walking or water exercise was recommended to maintain overall health and aid in weight control.   7.  Follow up as needed for subsequent injections.  8.  Patient was counseled to abstain from tobacco products.    Diagnosis     * Degeneration of cervical intervertebral disc [M50.30]     * Spinal stenosis, cervical region [M48.02]

## 2021-07-28 NOTE — ANESTHESIA PROCEDURE NOTES
PAIN Epidural block      Patient reassessed immediately prior to procedure    Patient location during procedure: pain clinic  Start Time: 7/28/2021 10:15 AM  Stop Time: 7/28/2021 10:36 AM  Indication:procedure for pain  Performed By  Anesthesiologist: Bryson Robison MD  Preanesthetic Checklist  Completed: patient identified, site marked, risks and benefits discussed, surgical consent, monitors and equipment checked, pre-op evaluation and timeout performed  Additional Notes  Post-Op Diagnosis Codes:     * Degeneration of cervical intervertebral disc (M50.30)     * Spinal stenosis, cervical region (M48.02)    The patient was observed in recovery with no evidence of neurological deficits or other problems. All questions were answered. The patient was discharged with appropriate instructions.  Prep:  Pt Position:prone  Sterile Tech:cap, gloves, mask and sterile barrier  Prep:chlorhexidine gluconate and isopropyl alcohol  Monitoring:blood pressure monitoring, continuous pulse oximetry and EKG  Procedure:Sedation: no     Approach:midline  Guidance: fluoroscopy  Location:cervical  Level:6-7 (Interlaminar)  Needle Type:Tuohy  Needle Gauge:20 G  Aspiration:negative  Medications:  Depomedrol:80 mg  Preservative Free Saline:3mL  Isovue:2mL  Comments:Isovue dye spread was consistent with epidural placement.  Post Assessment:  Dressing:occlusive dressing applied  Pt Tolerance:patient tolerated the procedure well with no apparent complications  Complications:no

## 2021-08-04 ENCOUNTER — OFFICE VISIT (OUTPATIENT)
Dept: ORTHOPEDIC SURGERY | Facility: CLINIC | Age: 56
End: 2021-08-04

## 2021-08-04 VITALS — TEMPERATURE: 98 F | HEIGHT: 74 IN | BODY MASS INDEX: 36.57 KG/M2 | WEIGHT: 285 LBS

## 2021-08-04 DIAGNOSIS — Z98.890 STATUS POST ARTHROSCOPY OF RIGHT SHOULDER: Primary | ICD-10-CM

## 2021-08-04 PROCEDURE — 99024 POSTOP FOLLOW-UP VISIT: CPT | Performed by: PHYSICIAN ASSISTANT

## 2021-08-04 RX ORDER — DOXYCYCLINE HYCLATE 100 MG/1
100 CAPSULE ORAL 2 TIMES DAILY
Qty: 20 CAPSULE | Refills: 0 | Status: SHIPPED | OUTPATIENT
Start: 2021-08-04 | End: 2021-12-16

## 2021-08-04 NOTE — PROGRESS NOTES
OTHER POST OP GLOBAL     NAME: LALO Morris  ?  : 1965  ?  MRN: [id number]    Chief Complaint   Patient presents with   • Right Shoulder - Follow-up, Pain   • Post-op   • Wound Check     ?  Date of surgery: 21    HPI:   Patient returns today for 2.5 week follow up of right rotator cuff repair and edgard resection. Incision(s) and Arthroscopic portals healing nicely/as expected although the posterior arthroscopic portal shows slight erythema. He reports a dark yellow pus coming from the portal yesterday. Patient reports doing well with no unusual complaints. Appears to be progressing appropriately.      Ortho Exam:   Right shoulder   The patient is status-post rotator cuff repair 2.5 week(s).   Incision is clean and healing well. Arthroscopic portals posteriorly shows slight erythema with small amount of exudate at site.   Appropriate amounts of swelling and bruising are noted.    Axillary nerve function is well preserved. Radial artery pulses are palpable.        Assessment:  Diagnoses and all orders for this visit:    1. Status post arthroscopy of right shoulder (Primary)  -     doxycycline (Vibramycin) 100 MG capsule; Take 1 capsule by mouth 2 (Two) Times a Day.  Dispense: 20 capsule; Refill: 0          Plan   • Incision care discussed. Advised I am not too concerned with the portal of concern however to be safe I will start him on doxycycline.   • Continue with plan of care as instructed by Dr. Titus at last visit.  • Fall precautions  • Follow up as scheduled    Date of encounter: 2021  Henry Ashford PA-C    Electronically signed by Henry Ashford PA-C, 21, 12:53 PM EDT.

## 2021-08-10 ENCOUNTER — OFFICE VISIT (OUTPATIENT)
Dept: FAMILY MEDICINE CLINIC | Facility: CLINIC | Age: 56
End: 2021-08-10

## 2021-08-10 VITALS
SYSTOLIC BLOOD PRESSURE: 124 MMHG | HEART RATE: 90 BPM | DIASTOLIC BLOOD PRESSURE: 68 MMHG | BODY MASS INDEX: 37.6 KG/M2 | OXYGEN SATURATION: 95 % | WEIGHT: 293 LBS | HEIGHT: 74 IN

## 2021-08-10 DIAGNOSIS — M79.652 BILATERAL THIGH PAIN: ICD-10-CM

## 2021-08-10 DIAGNOSIS — M25.572 CHRONIC PAIN OF LEFT ANKLE: Primary | ICD-10-CM

## 2021-08-10 DIAGNOSIS — G89.29 CHRONIC PAIN OF LEFT ANKLE: Primary | ICD-10-CM

## 2021-08-10 DIAGNOSIS — M79.651 BILATERAL THIGH PAIN: ICD-10-CM

## 2021-08-10 PROCEDURE — 99212 OFFICE O/P EST SF 10 MIN: CPT | Performed by: NURSE PRACTITIONER

## 2021-08-10 NOTE — PROGRESS NOTES
Chief Complaint  Leg Pain (Both upper thighs) and Ankle Pain (LT)    Subjective          R Alexandra presents to [facility]  Mr. Morris presents today with complaints of sharp pain shooting through his left ankle. This has been going on for many years. He reports that there are days that he can barely walk because of the ankle pain. He believes that it was his left ankle had a severe sprain about 4 to 5 years ago.  He is also having burning/stinging pain in one or both outer thighs. This to has been going on for several years. He does report a history of L1-L2 disc issues 20 years ago.     I have reviewed the patient's medical history in detail and updated the computerized patient record.    Current Outpatient Medications:   •  acetaminophen (TYLENOL) 500 MG tablet, Take 500 mg by mouth Daily As Needed for Mild Pain ., Disp: , Rfl:   •  cyclobenzaprine (FLEXERIL) 10 MG tablet, Take 1 tablet by mouth 3 (Three) Times a Day As Needed for Muscle Spasms., Disp: 30 tablet, Rfl: 1  •  docusate sodium (COLACE) 100 MG capsule, Take 1 capsule by mouth 2 (Two) Times a Day., Disp: 60 capsule, Rfl: 0  •  doxycycline (Vibramycin) 100 MG capsule, Take 1 capsule by mouth 2 (Two) Times a Day., Disp: 20 capsule, Rfl: 0  •  Doxylamine Succinate, Sleep, (SLEEP AID PO), Take 1 tablet by mouth At Night As Needed., Disp: , Rfl:   •  ibuprofen (Advil) 200 MG tablet, Take 200 mg by mouth Daily As Needed for Mild Pain . PT TO STOP PER MD INSTRUCTION, Disp: , Rfl:   •  lisinopril (PRINIVIL,ZESTRIL) 10 MG tablet, TAKE ONE TABLET BY MOUTH DAILY (Patient taking differently: Take 10 mg by mouth Daily.), Disp: 30 tablet, Rfl: 11  •  oxyCODONE-acetaminophen (PERCOCET) 7.5-325 MG per tablet, Take 1 tablet by mouth Every 12 (Twelve) Hours As Needed for Moderate Pain . Indications: S/P ROTATOR CUFF REPAIR, Disp: 40 tablet, Rfl: 0     Objective   Vital Signs:   /68 (BP Location: Left arm, Patient Position: Sitting,  "Cuff Size: Adult)   Pulse 90   Ht 188 cm (74\")   Wt 133 kg (293 lb)   SpO2 95%   BMI 37.62 kg/m²       Physical Exam  Vitals reviewed.   Constitutional:       Appearance: Normal appearance.   Neurological:      Mental Status: He is alert and oriented to person, place, and time.   Psychiatric:         Mood and Affect: Mood normal.         Behavior: Behavior normal.         Thought Content: Thought content normal.         Judgment: Judgment normal.        Result Review :                 Assessment and Plan    Diagnoses and all orders for this visit:    1. Chronic pain of left ankle (Primary)    2. Bilateral thigh pain      Mr. Morris is currently under the care of Dr. Titus at [facility]. I have advised him to call Dr. Titus's office and make an appointment to be evaluated for his ankle and upper leg pain.     Follow Up   Return if symptoms worsen or fail to improve, for Next scheduled follow up.  Patient was given instructions and counseling regarding his condition or for health maintenance advice. Please see specific information pulled into the AVS if appropriate.       "

## 2021-08-19 ENCOUNTER — OFFICE VISIT (OUTPATIENT)
Dept: ORTHOPEDIC SURGERY | Facility: CLINIC | Age: 56
End: 2021-08-19

## 2021-08-19 VITALS — HEIGHT: 74 IN | TEMPERATURE: 97.2 F | BODY MASS INDEX: 37.6 KG/M2 | WEIGHT: 293 LBS

## 2021-08-19 DIAGNOSIS — Z98.890 STATUS POST ARTHROSCOPY OF RIGHT SHOULDER: Primary | ICD-10-CM

## 2021-08-19 DIAGNOSIS — M75.121 NONTRAUMATIC COMPLETE TEAR OF RIGHT ROTATOR CUFF: Primary | ICD-10-CM

## 2021-08-19 PROCEDURE — 99024 POSTOP FOLLOW-UP VISIT: CPT | Performed by: ORTHOPAEDIC SURGERY

## 2021-08-19 RX ORDER — HYDROCODONE BITARTRATE AND ACETAMINOPHEN 7.5; 325 MG/1; MG/1
1 TABLET ORAL EVERY 12 HOURS PRN
Qty: 40 TABLET | Refills: 0 | Status: SHIPPED | OUTPATIENT
Start: 2021-08-19 | End: 2021-12-16

## 2021-08-19 NOTE — PROGRESS NOTES
"Answers for HPI/ROS submitted by the patient on 2021  What is the primary reason for your visit?: Other  Please describe your symptoms.: follow up  Have you had these symptoms before?: Yes  How long have you been having these symptoms?: Greater than 2 weeks  Please list any medications you are currently taking for this condition.: percocet, flexiril  Please describe any probable cause for these symptoms. : surgery    Chief Complaint  Follow-up and Pain of the Right Shoulder and Post-op    Subjective    History of Present Illness {CC  Problem List  Visit Diagnosis   Encounters  Notes  Medications  Labs  Result Review Imaging  Media :23}     LALO Morris is a 56 y.o. male who presents to [facility] for   History of Present Illness   Pain Location: {AS Body Parts:64181}  Radiation: {asradiationpain:93759}  Quality: {asquality:50322}  Intensity/Severity: {asseveritypain:61269}  Duration: {Time:50316}  Progression of symptoms: {Stucker yes no:28229}  Onset quality: {asonsetquality:85199}  Timing: {astimin}  Aggravating Factors: {AS Aggravating Factors Ortho:02578}  Alleviating Factors: {AS Alleviating Factors:71578}  Previous Episodes: {aspreviousepisodes:23069}  Associated Symptoms: {asassociatedsymptoms:26377}  ADLs Affected: {ADL ASMEH:08474}  Previous Treatment: {AS previous treatment:92114}       Objective   Vital Signs:   Temp 97.2 °F (36.2 °C)   Ht 188 cm (74.02\")   Wt 133 kg (293 lb)   BMI 37.60 kg/m²     Physical Exam  Physical Exam  Vitals signs and nursing note reviewed.   Constitutional:       Appearance: Normal appearance.   Pulmonary:      Effort: Pulmonary effort is normal.   Skin:     General: Skin is warm and dry.      Capillary Refill: Capillary refill takes less than 2 seconds.   Neurological:      General: No focal deficit present.      Mental Status: He is alert and oriented to person, place, and time. Mental status is at baseline.   Psychiatric:    "      Mood and Affect: Mood normal.         Behavior: Behavior normal.         Thought Content: Thought content normal.         Judgment: Judgment normal.     Ortho Exam   {Musculoskeletal Exams:00242}    {Post Op Total Joint Arthroplasty Exam:69437}    {Post Op Detailed Exam:22062}        Result Review :{ Labs  Result Review  Imaging  Med Tab  Media :23}   The following data was reviewed by: Antonina Ramírez MA on 08/19/2021:  {Data reviewed (optional):67194}  {Diagnostics options AS:95115}            Procedures           Assessment   Assessment and Plan {CC Problem List  Visit Diagnosis  ROS  Review (Popup)  Health Maintenance  Quality  BestPractice  Medications  SmartSets  SnapShot Encounters  Media :23}   There are no diagnoses linked to this encounter.    {Time Spent (Optional):63458}  Follow Up {Instructions Charge Capture  Follow-up Communications :23}  · Compression/brace  · Rest, ice, compression, and elevation (RICE) therapy  · Stretching and strengthening exercises  · OTC {OTC pain:45115}  · Follow up in *** {WEEKS/MONTHS:10526}  • Patient was given instructions and counseling regarding his condition or for health maintenance advice. Please see specific information pulled into the AVS if appropriate.     Antonina Ramírez MA   Date of Encounter: 8/19/2021   Electronically signed by Antonina Ramírez MA, 08/19/21, 9:08 AM EDT.     EMR Dragon/Transcription disclaimer:  Much of this encounter note is an electronic transcription/translation of spoken language to printed text. The electronic translation of spoken language may permit erroneous, or at times, nonsensical words or phrases to be inadvertently transcribed; Although I have reviewed the note for such errors, some may still exist.

## 2021-08-19 NOTE — PROGRESS NOTES
OTHER POST OP GLOBAL     NAME: LALO Morris  ?  : 1965  ?  MRN: [id number]    Chief Complaint   Patient presents with   • Right Shoulder - Follow-up, Pain   • Post-op     ?  Date of surgery: 2021    HPI:   Patient returns today for 5 week follow up of right rotator cuff repair. Arthroscopic portals healing nicely/as expected with no signs of infection. Patient reports doing well with no unusual complaints. Appears to be progressing appropriately.  He is doing extremely well with minimal use of pain medication at this point.  He is wanting to go back to work on a light duty status.      Ortho Exam:   Right shoulder. The patient is status-post rotator cuff repair 5 week(s) . Incision is clean and healing well. Arthroscopic portals are clean. Appropriate amounts of swelling and bruising are noted. The patients pain is well controlled. The passive range of motion is abduction 0-90 degrees, flexion 0-90 degrees. Axillary nerve function is well preserved. Radial artery pulses are palpable.      Diagnostic Studies:  no diagnostic testing performed this visit      Assessment:  Diagnoses and all orders for this visit:    1. Status post arthroscopy of right shoulder (Primary)          Plan   • Incision care  • Continue ice as needed  • Active/Active Assisted ROM of elbow, wrist and hand  • Stretching and strengthening exercises of the shoulder to prevent arthrofibrosis.  • Note for physical therapy given to the patient to follow the reverse total shoulder arthroplasty protocols.  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  • Alternate Ibuprofen and Tylenol as needed  • Fall precautions  • Follow up in 6 week(s)     Date of encounter: 2021  Dandre Titus MD                 Answers for HPI/ROS submitted by the patient on 2021  What is the primary reason for your visit?: Other  Please describe your symptoms.:  follow up  Have you had these symptoms before?: Yes  How long have you been having these symptoms?: Greater than 2 weeks  Please list any medications you are currently taking for this condition.: percocet, flexiril  Please describe any probable cause for these symptoms. : surgery

## 2021-09-20 ENCOUNTER — APPOINTMENT (OUTPATIENT)
Dept: PAIN MEDICINE | Facility: HOSPITAL | Age: 56
End: 2021-09-20

## 2021-09-21 ENCOUNTER — TELEPHONE (OUTPATIENT)
Dept: ORTHOPEDIC SURGERY | Facility: CLINIC | Age: 56
End: 2021-09-21

## 2021-09-21 DIAGNOSIS — S49.91XA INJURY OF RIGHT SHOULDER, INITIAL ENCOUNTER: Primary | ICD-10-CM

## 2021-09-21 NOTE — TELEPHONE ENCOUNTER
"Caller: R \"DANAY\" TARRENCE    Relationship to patient: SELF    Best call back number: [phone]    Chief complaint: SHOULDER POST OP    Type of visit: POST OP    Requested date: ASAP    If rescheduling, when is the original appointment: 9/30/2021    Additional notes: PT HAD SHOULDER SURGERY ON 7/16/2021. HE HAS A POST OP SCHEDULED FOR NEXT WEEK, BUT HE THINKS THAT HE MAY HAVE INJURED THE SAME SHOULDER AGAIN AND HAS ASKED TO BE SEEN SOONER. HE IS HAVING INCREASED PAIN.     "

## 2021-09-21 NOTE — TELEPHONE ENCOUNTER
See if we can bring him in to see SADIQ tomorrow. This is one I feel he needs to see since he just had surgery recently.

## 2021-09-22 ENCOUNTER — HOSPITAL ENCOUNTER (OUTPATIENT)
Dept: GENERAL RADIOLOGY | Facility: HOSPITAL | Age: 56
Discharge: HOME | End: 2021-09-22
Admitting: ORTHOPAEDIC SURGERY

## 2021-09-22 ENCOUNTER — OFFICE VISIT (OUTPATIENT)
Dept: ORTHOPEDIC SURGERY | Facility: CLINIC | Age: 56
End: 2021-09-22

## 2021-09-22 VITALS — HEIGHT: 74 IN | WEIGHT: 293 LBS | TEMPERATURE: 97.5 F | BODY MASS INDEX: 37.6 KG/M2

## 2021-09-22 DIAGNOSIS — Z98.890 STATUS POST ARTHROSCOPY OF RIGHT SHOULDER: Primary | ICD-10-CM

## 2021-09-22 DIAGNOSIS — S49.91XA INJURY OF RIGHT SHOULDER, INITIAL ENCOUNTER: ICD-10-CM

## 2021-09-22 PROCEDURE — 99024 POSTOP FOLLOW-UP VISIT: CPT | Performed by: ORTHOPAEDIC SURGERY

## 2021-09-22 PROCEDURE — 73030 X-RAY EXAM OF SHOULDER: CPT

## 2021-09-22 NOTE — PROGRESS NOTES
"Chief Complaint  Pain and Establish Care of the Right Shoulder    Subjective    History of Present Illness      LALO Morris is a 56 y.o. male who presents to [facility] for follow-up on a rotator cuff repair of his right shoulder.  History of Present Illness the patient is now roughly 2 months out from a rotator cuff repair performed through a mini open approach by me.  The surgery was performed on 16 July 2021.  He states that he has done reasonably well although the therapy has been progressing somewhat slowly.  Unfortunately this past Monday there was a person in his vicinity who was falling hard and he reached out to catch that individual.  This led to a direct jerk to his shoulder.  The patient is concerned that he might have reinjured his shoulder.  We will go ahead and get an x-ray today to make sure that he does not have any fractures of his proximal humerus.  He states that he is not yet ready to go back to work because his range of motion has not improved to a functionally normal level after the surgery and with physical therapy.  Pain Location: RIGHT shoulder  Radiation: none  Quality: stabbing, burning  Intensity/Severity: moderate  Duration: since 09/20/2021  Progression of symptoms: yes, progressive worsening  Onset quality: sudden after catching a person that was falling at his home while moving furniture   Timing: intermittent  Aggravating Factors: overhead reaching, repetitive motion  Alleviating Factors: rest  Previous Episodes: no  Associated Symptoms: pain, clicking/popping, swelling  ADLs Affected: dressing, work related activities, recreational activities/sports  Previous Treatment: prior surgery       Objective   Vital Signs:   Temp 97.5 °F (36.4 °C)   Ht 188 cm (74\")   Wt 133 kg (293 lb)   BMI 37.62 kg/m²     Physical Exam  Physical Exam  Vitals signs and nursing note reviewed.   Constitutional:       Appearance: Normal appearance.   Pulmonary:      Effort: " Pulmonary effort is normal.   Skin:     General: Skin is warm and dry.      Capillary Refill: Capillary refill takes less than 2 seconds.   Neurological:      General: No focal deficit present.      Mental Status: He is alert and oriented to person, place, and time. Mental status is at baseline.   Psychiatric:         Mood and Affect: Mood normal.         Behavior: Behavior normal.         Thought Content: Thought content normal.         Judgment: Judgment normal.     Ortho Exam       Right shoulder. The patient is status-post rotator cuff repair 8 week(s) . Incision is clean and healing well. Arthroscopic portals are clean. Appropriate amounts of swelling and bruising are noted. The patients pain is well controlled. The passive range of motion is abduction 0-100 degrees, flexion 0-100 degrees. Axillary nerve function is well preserved. Radial artery pulses are palpable.  There is no evidence of a dislocation of the shoulder following the episode of trauma.  Apprehension sign is negative.  Sulcus sign is negative.        Result Review :{ Labs  Result Review  Imaging  Med Tab  Media :23}   The following data was reviewed by: Dandre Titus MD on 09/22/2021:  Radiologic studies - see below for interpretation  RIGHT shoulder xrays  weightbearing/standing ap/lateral views were ordered by Dandre Titus MD. Performed at [facility] on 09/22/2021. Images were independently viewed and interpreted by myself, my impression as follows:    right Shoulder X-Ray  Indication: Evaluation of pain and discomfort after trauma to the shoulder.  AP and Lateral  Findings: Suture anchors appear to be in good position without any secondary displacement.  No fractures of the greater tuberosity.  no bony lesion  Soft tissues within normal limits  within normal limits joint spaces  Hardware appropriately positioned not applicable      yes prior studies available for comparison.    X-RAY was ordered and reviewed by  Dandre Titus MD            Procedures           Assessment   Assessment and Plan    Diagnoses and all orders for this visit:    1. Status post arthroscopy of right shoulder (Primary)          Follow Up   · Discussed with the patient that a retear of the rotator cuff cannot accurately be diagnosed with an x-ray following the trauma.  The patient does understand that.  · If he continues to have symptoms especially pain with weakness in abduction following the trauma in a surgically repaired shoulder then he would most likely need an MRI with arthrographic study to see if there are any disruptions of the repaired cuff line or pulling out of the sutures from the margin of the tendon.  · Off work note given to the patient until his follow up with me in 6 weeks.  · Rest, ice, compression, and elevation (RICE) therapy  · Stretching and strengthening exercises of the flexors and abductors of the shoulder.  · It is okay to wean off his sling.  · Continue with outpatient physical therapy with stretching and strengthening exercises following the rehabilitation protocols of the rotator cuff repair.  · Patient has been given a note to be off work at this point because he is not safely ready to go back to work just yet.  · OTC Alternate Ibuprofen and Tylenol as needed  · Follow up in 6 week(s)  • Patient was given instructions and counseling regarding his condition or for health maintenance advice. Please see specific information pulled into the AVS if appropriate.     Dandre Titus MD   Date of Encounter: 9/22/2021       EMR Dragon/Transcription disclaimer:  Much of this encounter note is an electronic transcription/translation of spoken language to printed text. The electronic translation of spoken language may permit erroneous, or at times, nonsensical words or phrases to be inadvertently transcribed; Although I have reviewed the note for such errors, some may still exist.

## 2021-10-19 ENCOUNTER — TELEPHONE (OUTPATIENT)
Dept: ORTHOPEDIC SURGERY | Facility: CLINIC | Age: 56
End: 2021-10-19

## 2021-10-19 NOTE — TELEPHONE ENCOUNTER
Caller: LALO Morris    Relationship to patient: Self    Best call back number: [phone]    Chief complaint: HE IS WANTING AN MRI - PT ISNT HELPING    Type of visit: RADIOLOGY    Requested date:BEFORE 11/04/2021 APPT            ”

## 2021-10-21 NOTE — TELEPHONE ENCOUNTER
PT IS CALLING BACK TO CHECK ON STATUS OF POSSIBLY GETTING AN MRI, WOULD LIKE TO KNOW WHERE THINGS ARE WITH THIS. CALLBACK NUMBER [phone].

## 2021-10-22 ENCOUNTER — APPOINTMENT (OUTPATIENT)
Dept: PAIN MEDICINE | Facility: HOSPITAL | Age: 56
End: 2021-10-22

## 2021-10-25 DIAGNOSIS — M25.511 RIGHT SHOULDER PAIN, UNSPECIFIED CHRONICITY: Primary | ICD-10-CM

## 2021-10-25 NOTE — TELEPHONE ENCOUNTER
PATIENT CALLED BACK CHECKING STATUS OF MRI SCHEDULING.  IT LOOKS LIKE HIS LAST OFFICE NOTE ON 9/22/21 WITH DR. MCMANUS STATES THAT HE MAY NEED MR ARTHROGRAM

## 2021-11-04 ENCOUNTER — TELEPHONE (OUTPATIENT)
Dept: ORTHOPEDIC SURGERY | Facility: CLINIC | Age: 56
End: 2021-11-04

## 2021-11-04 ENCOUNTER — OFFICE VISIT (OUTPATIENT)
Dept: ORTHOPEDIC SURGERY | Facility: CLINIC | Age: 56
End: 2021-11-04

## 2021-11-04 VITALS — BODY MASS INDEX: 36.57 KG/M2 | HEIGHT: 74 IN | WEIGHT: 285 LBS | TEMPERATURE: 98.5 F

## 2021-11-04 DIAGNOSIS — S46.011D TRAUMATIC COMPLETE TEAR OF RIGHT ROTATOR CUFF, SUBSEQUENT ENCOUNTER: ICD-10-CM

## 2021-11-04 DIAGNOSIS — M25.511 RIGHT SHOULDER PAIN, UNSPECIFIED CHRONICITY: Primary | ICD-10-CM

## 2021-11-04 DIAGNOSIS — Z98.890 STATUS POST ARTHROSCOPY OF RIGHT SHOULDER: ICD-10-CM

## 2021-11-04 PROCEDURE — 99213 OFFICE O/P EST LOW 20 MIN: CPT | Performed by: ORTHOPAEDIC SURGERY

## 2021-11-04 NOTE — PROGRESS NOTES
"Orthopedic follow-up visit.    NAME: LALO Morris  ?  : 1965  ?  MRN: [id number]    Chief Complaint   Patient presents with   • Right Shoulder - Pain, Follow-up     ?  Date of surgery:  2021    HPI:   Patient returns today for 3.5 month follow up of right rotator cuff repair. Arthroscopic portals healed nicely with no signs of infection. Patient reports doing well with no unusual complaints. Appears to be progressing appropriately.  He states that he was doing quite well up until the recent episode of trauma.  He states that he was injured when a coworker basically fell on him.  He reached out to prevent and break the other person's fall when he felt a pop in his shoulder.  Since that time he has had severe pain and discomfort in his shoulder.  He has weakness in abduction.  He has renewed sense of catching in that shoulder joint.  Cross body activities bother the patient significantly.  He states that he does not have the constant pain but he has a very severe pain on the abducting the shoulder.  Bringing the arm down by the side bothers the patient significantly.  \"I cannot pass my work physical examination and therefore I am not able to go back to work until I can have my shoulder evaluated \".  I am very much concerned that he has most likely developed a recurrent tear of his rotator cuff postsurgical repair.      Ortho Exam:   Right shoulder. The patient is status-post rotator cuff repair 3.5 month(s) . Incision is clean and healing well. Arthroscopic portals are clean.  There are no amounts of swelling and bruising  noted. The patients pain is well controlled. The passive range of motion is abduction 0-100 degrees, flexion 0-100 degrees. Axillary nerve function is well preserved. Radial artery pulses are palpable.  He does have a slight catch in mid abduction.  There is distinct weakness of the muscle function in mid abduction at 90 degrees.      Diagnostic Studies:  no diagnostic testing performed " this visit      Assessment:  Diagnoses and all orders for this visit:    1. Right shoulder pain, unspecified chronicity (Primary)  -     [place] Contrast Injection CT / MRI; Future  -     MRI shoulder right arthrogram; Future    2. Status post arthroscopy of right shoulder  -     [place] Contrast Injection CT / MRI; Future  -     MRI shoulder right arthrogram; Future    3. Traumatic complete tear of right rotator cuff, subsequent encounter          Plan   • Extensive regression with the patient and his wife about diagnostic possibilities.  • At this point I think he has sustained a reinjury following a traumatic event after the mini open repair of the rotator cuff was performed earlier by me in July.  In my opinion we definitely need to go ahead and get an MR arthrogram of the shoulder to assess the extent of possible reinjury to the shoulder joint.  • The patient is not able to go back to work and therefore we are giving him a note to be off work till he can safely return back to his job description without any fear of repeat injury.  • Tablet meloxicam 7.5 mg tab 1 p.o. twice daily for pain swelling and discomfort.  • Continue ice as needed  • Aggressive ROM to prevent arthrofibrosis.  • Stretching and strengthening exercises  • Alternate Ibuprofen and Tylenol as needed  • Fall precautions  • Schedule right shoulder MR Arthrogram for evaluation of integrity versus disruption of the rotator cuff which was surgically repaired.  • Follow up once MR Arthrogram of the right shoulder has been completed    Date of encounter: 11/04/2021  Dandre Titus MD

## 2021-11-04 NOTE — TELEPHONE ENCOUNTER
Provider: MARIETTA  Caller: DANAY PETTY  Relationship to Patient: PATIENT  Pharmacy: N/A  Phone Number: [phone]  Reason for Call: PATIENT HAS MRI 11.17.21 AND NEEDS APPT FOR RESULTS. FIRST AVAILABLE IS DECEMBER  When was the patient last seen: 9.22.21

## 2021-11-12 ENCOUNTER — TELEPHONE (OUTPATIENT)
Dept: ORTHOPEDIC SURGERY | Facility: CLINIC | Age: 56
End: 2021-11-12

## 2021-11-12 NOTE — TELEPHONE ENCOUNTER
PATIENT IS SCHEDULED FOR A MR ARTHROGRAM ON 11/17/21 WE HAVE TO DO A PEER TO PEER TO GET IT AUTHORIZED    LET ME KNOW WHEN YOU WOULD LIKE TO DO IT     MEMBER ID IS: [id number]  [phone]

## 2021-11-12 NOTE — TELEPHONE ENCOUNTER
I guess I would have to do it on Monday afternoon after surgery cases.  However I do have 7 joint replacements that day and it would possibly be late by the time I arrived to the office.  The other option would be Wednesday morning first thing in the morning at the [place] office like 8 or 830 before we actually start seeing patients.  I do not know what else to suggest for this precertification thank you

## 2021-11-16 ENCOUNTER — TELEPHONE (OUTPATIENT)
Dept: ORTHOPEDIC SURGERY | Facility: CLINIC | Age: 56
End: 2021-11-16

## 2021-11-17 ENCOUNTER — HOSPITAL ENCOUNTER (OUTPATIENT)
Dept: INTERVENTIONAL RADIOLOGY/VASCULAR | Facility: HOSPITAL | Age: 56
Discharge: HOME | End: 2021-11-17

## 2021-11-17 ENCOUNTER — APPOINTMENT (OUTPATIENT)
Dept: MRI IMAGING | Facility: HOSPITAL | Age: 56
End: 2021-11-17

## 2021-12-02 ENCOUNTER — HOSPITAL ENCOUNTER (OUTPATIENT)
Dept: INTERVENTIONAL RADIOLOGY/VASCULAR | Facility: HOSPITAL | Age: 56
Discharge: HOME | End: 2021-12-02

## 2021-12-02 ENCOUNTER — HOSPITAL ENCOUNTER (OUTPATIENT)
Dept: MRI IMAGING | Facility: HOSPITAL | Age: 56
Discharge: HOME | End: 2021-12-02

## 2021-12-02 DIAGNOSIS — M25.511 RIGHT SHOULDER PAIN, UNSPECIFIED CHRONICITY: ICD-10-CM

## 2021-12-02 DIAGNOSIS — Z98.890 STATUS POST ARTHROSCOPY OF RIGHT SHOULDER: ICD-10-CM

## 2021-12-02 PROCEDURE — 25010000002 IOPAMIDOL 61 % SOLUTION: Performed by: ORTHOPAEDIC SURGERY

## 2021-12-02 PROCEDURE — 77002 NEEDLE LOCALIZATION BY XRAY: CPT

## 2021-12-02 PROCEDURE — 73222 MRI JOINT UPR EXTREM W/DYE: CPT

## 2021-12-02 PROCEDURE — 0 GADOBENATE DIMEGLUMINE 529 MG/ML SOLUTION: Performed by: ORTHOPAEDIC SURGERY

## 2021-12-02 PROCEDURE — A9577 INJ MULTIHANCE: HCPCS | Performed by: ORTHOPAEDIC SURGERY

## 2021-12-02 RX ORDER — LIDOCAINE HYDROCHLORIDE 20 MG/ML
INJECTION, SOLUTION INFILTRATION; PERINEURAL
Status: COMPLETED
Start: 2021-12-02 | End: 2021-12-02

## 2021-12-02 RX ORDER — SODIUM CHLORIDE 9 MG/ML
10 INJECTION INTRAVENOUS
Status: COMPLETED | OUTPATIENT
Start: 2021-12-02 | End: 2021-12-02

## 2021-12-02 RX ADMIN — IOPAMIDOL 15 ML: 612 INJECTION, SOLUTION INTRATHECAL at 13:37

## 2021-12-02 RX ADMIN — LIDOCAINE HYDROCHLORIDE 10 ML: 20 INJECTION, SOLUTION INFILTRATION; PERINEURAL at 13:35

## 2021-12-02 RX ADMIN — SODIUM CHLORIDE 10 ML: 9 INJECTION INTRAMUSCULAR; INTRAVENOUS; SUBCUTANEOUS at 13:38

## 2021-12-02 RX ADMIN — GADOBENATE DIMEGLUMINE 5 ML: 529 INJECTION, SOLUTION INTRAVENOUS at 13:38

## 2021-12-09 ENCOUNTER — OFFICE VISIT (OUTPATIENT)
Dept: ORTHOPEDIC SURGERY | Facility: CLINIC | Age: 56
End: 2021-12-09

## 2021-12-09 VITALS — TEMPERATURE: 97.5 F | BODY MASS INDEX: 36.57 KG/M2 | WEIGHT: 285 LBS | HEIGHT: 74 IN

## 2021-12-09 DIAGNOSIS — S46.011D TRAUMATIC COMPLETE TEAR OF RIGHT ROTATOR CUFF, SUBSEQUENT ENCOUNTER: ICD-10-CM

## 2021-12-09 DIAGNOSIS — Z98.890 STATUS POST ARTHROSCOPY OF RIGHT SHOULDER: Primary | ICD-10-CM

## 2021-12-09 PROCEDURE — 99214 OFFICE O/P EST MOD 30 MIN: CPT | Performed by: ORTHOPAEDIC SURGERY

## 2021-12-09 NOTE — PROGRESS NOTES
"Chief Complaint  Follow-up and Pain of the Right Shoulder and Results (MR ARTHROGRAM RIGHT SHOULDER)    Subjective    History of Present Illness      LALO Morris is a 56 y.o. male who presents to [facility] for assistant right shoulder pain after a reinjury following rotator cuff repair surgery.  History of Present Illness the patient presents back to the office with pain and discomfort in the right shoulder.  He had initial rotator cuff repair performed earlier in the year.  Subsequent to that he sustained a repeat injury to his shoulder when a coworker/another individual was basically falling and the patient reached out to stabilize him.  It appears that he has sustained a repeat tear of his rotator cuff.  He is symptomatic enough at this point that he is wanting to have revision surgery.  He is most likely going to need a patch graft to overcome the deficit at the site of the retear.  Pain Location: RIGHT shoulder  Radiation: none  Quality: stabbing, burning  Intensity/Severity: moderate to severe  Duration: since 09/20/2021  Progression of symptoms: no worsening, symptoms stable/unchanged  Onset quality: sudden after catching a person that was falling at his home while moving furniture   Timing: intermittent  Aggravating Factors: overhead reaching, repetitive motion  Alleviating Factors: NSAIDs, rest  Previous Episodes: yes  Associated Symptoms: pain, swelling, clicking/popping, decreased ROM  ADLs Affected: dressing, ambulating, work related activities, recreational activities/sports  Previous Treatment: NSAIDs and prior surgery       Objective   Vital Signs:   Temp 97.5 °F (36.4 °C)   Ht 188 cm (74\")   Wt 129 kg (285 lb)   BMI 36.59 kg/m²     Physical Exam  Physical Exam  Vitals signs and nursing note reviewed.   Constitutional:       Appearance: Normal appearance.   Pulmonary:      Effort: Pulmonary effort is normal.   Skin:     General: Skin is warm and dry.      Capillary " Refill: Capillary refill takes less than 2 seconds.   Neurological:      General: No focal deficit present.      Mental Status: He is alert and oriented to person, place, and time. Mental status is at baseline.   Psychiatric:         Mood and Affect: Mood normal.         Behavior: Behavior normal.         Thought Content: Thought content normal.         Judgment: Judgment normal.     Ortho Exam   Right shoulder (rct). The shoulder is extremely tender anteriorly. There is tenderness over the insertion of the rotator cuff over the greater tuberosity of the humerus. Slight proximal migration of the humeral head is noted. AC joint is tender. Crossover adduction test is positive. Drop arm sign is positive. Forward flexion is 0-100 degrees, abduction is 0-1. degrees, external rotation is 0 20 degrees. Patient has mild atrophy both of the supra and infraspinatus fossa. Sulcus sign is negative. There is no evidence of multidirectional instability. Neer test is positive on compression. Skin and soft tissue tenderness is noted. Patient's strength in abduction and external rotation are extremely lacking. The pain level is 6.    Right shoulder. The patient is status-post rotator cuff repair 4.5 month(s) . Incision is clean and healing well. Arthroscopic portals are clean. Appropriate amounts of swelling and bruising are noted. The patients pain is well controlled. The passive range of motion is abduction 0-100 degrees, flexion 0-100 degrees. Axillary nerve function is well preserved. Radial artery pulses are palpable.        Result Review :   The following data was reviewed by: Dandre Titus MD on 12/09/2021:  Radiologic studies - see below for interpretation  Reviewed MR Arthrogram report of right shoulder, performed at  [facility] on 12/02/2021, summary of impression below:  MR arthrogram report is available in the office today.  These images are discussed with the patient and his wife.  There is a prior distal clavicle  resection and rotator cuff repair which appears to be satisfactory.  There is a repeat tear of the rotator cuff tendon at the footprint.  The retear involves 0.7 cm in one dimension and 0.6 cm in the other dimension.  There is fatty atrophy of the teres minor.  There is a partial-thickness tear of the infraspinatus which appears to be new in the interval.  The biceps anchor is intact.  No SLAP tears are noted.  Articular cartilage is thinned out to some degree on the glenohumeral articulation          Procedures           Assessment   Assessment and Plan    Diagnoses and all orders for this visit:    1. Status post arthroscopy of right shoulder (Primary)    2. Traumatic complete tear of right rotator cuff, subsequent encounter          Follow Up   · Extensive discussion the patient about different treatment options.  · At this point it is very unfortunate that he has sustained a retear and is not able to go back to work because he is extremely symptomatic.  · Extensive discussion with the patient about repeat surgery and possibly using a patch augmentation in case the tissue is atrophied and a full watertight closure of the rotator cuff is not possible.  · Rest, ice, compression, and elevation (RICE) therapy  · Stretching and strengthening exercises the flexors and abductors.  · OTC Alternate Ibuprofen and Tylenol as needed  · Schedule right shoulder rotator cuff repair  • Patient was given instructions and counseling regarding his condition or for health maintenance advice. Please see specific information pulled into the AVS if appropriate.   Patient has been diagnosed with a rotator cuff tear.  These tears can range from partial tears to complete tears of the muscle and/or tendon. They can also be categorized in size by width as being small, medium or large.  Diagnosis is confirmed with MRI or CT/arthrogram.  Management of these tears can be conservative with injections, physical therapy, activity modification and  use of NSAIDS as needed.  Surgery is the only way to actually fix these tears, as they will not heal or repair on their own.  These tears can usually be repaired with arthroscopic surgery, but occasionally open procedures are necessary.  Many factors can influence the outcomes. First, larger tears have a higher chance of failure from a healing perspective, although pain may improve nonetheless, potentially longer recovery and sometimes, depending on the quality of the tissue and the length of time the tear has been present, may not be repairable at all.  If the tear has been going on for a long time, the muscle can actually change to fatty tissue, and no longer act as muscle.  This can have a direct effect on not only the ability to repair the tear but also the outcome from the surgery itself both from a healing and functional perspective.  Therefore, the decision to proceed with surgery does have a time factor which can affect the quality of the tissue and reparability but also the potential for the tear to increase in size.  The longer you wait to repair the torn tendon there can be decreased potential for a successful outcome.    • Risks of surgery have been discussed with the patient in detail.  These risks include but are not limited to possibility of infection, DVT, continued pain, continued progression of arthritis, use of allograft tissue, limited range of motion and strength and further surgical intervention.  Patient understands that the surgery will benefit mechanical symptoms of pain and instability but may not help with symptoms of arthritis.      Dandre Titus MD   Date of Encounter: 12/9/2021       EMR Dragon/Transcription disclaimer:  Much of this encounter note is an electronic transcription/translation of spoken language to printed text. The electronic translation of spoken language may permit erroneous, or at times, nonsensical words or phrases to be inadvertently transcribed; Although I have  reviewed the note for such errors, some may still exist.

## 2021-12-10 ENCOUNTER — PREP FOR SURGERY (OUTPATIENT)
Dept: OTHER | Facility: HOSPITAL | Age: 56
End: 2021-12-10

## 2021-12-10 ENCOUNTER — TELEPHONE (OUTPATIENT)
Dept: ORTHOPEDIC SURGERY | Facility: CLINIC | Age: 56
End: 2021-12-10

## 2021-12-10 DIAGNOSIS — S46.011D TRAUMATIC COMPLETE TEAR OF RIGHT ROTATOR CUFF, SUBSEQUENT ENCOUNTER: Primary | ICD-10-CM

## 2021-12-10 RX ORDER — CEFAZOLIN SODIUM 2 G/100ML
2 INJECTION, SOLUTION INTRAVENOUS ONCE
Status: CANCELLED | OUTPATIENT
Start: 2021-12-10 | End: 2021-12-10

## 2021-12-10 NOTE — TELEPHONE ENCOUNTER
Good morning and night.  Can you please help me place these orders for this patient?  He is wanting to have a shoulder arthroscopy with mini open repair with possible patch augmentation of the rotator cuff tear at [facility] 1 week from today on Friday.  Thank you

## 2021-12-10 NOTE — TELEPHONE ENCOUNTER
PATIENT WOULD LIKE TO HAVE HIS RIGHT SHOULDER SCOPE MINI OPEN ROTATOR CUFF REPAIR POSSIBLE PATCH AUGMENTATION AT [facility] [place] by [facility] PLEASE PLACE ORDERS. THANKS!!!

## 2021-12-16 ENCOUNTER — ANESTHESIA EVENT (OUTPATIENT)
Dept: PERIOP | Facility: HOSPITAL | Age: 56
End: 2021-12-16

## 2021-12-16 ENCOUNTER — PRE-ADMISSION TESTING (OUTPATIENT)
Dept: PREADMISSION TESTING | Facility: HOSPITAL | Age: 56
End: 2021-12-16

## 2021-12-16 VITALS
OXYGEN SATURATION: 96 % | WEIGHT: 298 LBS | BODY MASS INDEX: 38.24 KG/M2 | DIASTOLIC BLOOD PRESSURE: 76 MMHG | HEIGHT: 74 IN | TEMPERATURE: 98.6 F | SYSTOLIC BLOOD PRESSURE: 122 MMHG | RESPIRATION RATE: 18 BRPM | HEART RATE: 93 BPM

## 2021-12-16 DIAGNOSIS — S46.011D TRAUMATIC COMPLETE TEAR OF RIGHT ROTATOR CUFF, SUBSEQUENT ENCOUNTER: ICD-10-CM

## 2021-12-16 LAB
ANION GAP SERPL CALCULATED.3IONS-SCNC: 7.1 MMOL/L (ref 5–15)
BUN SERPL-MCNC: 13 MG/DL (ref 6–20)
BUN/CREAT SERPL: 14.1 (ref 7–25)
CALCIUM SPEC-SCNC: 9.7 MG/DL (ref 8.6–10.5)
CHLORIDE SERPL-SCNC: 103 MMOL/L (ref 98–107)
CO2 SERPL-SCNC: 28.9 MMOL/L (ref 22–29)
CREAT SERPL-MCNC: 0.92 MG/DL (ref 0.76–1.27)
DEPRECATED RDW RBC AUTO: 42 FL (ref 37–54)
ERYTHROCYTE [DISTWIDTH] IN BLOOD BY AUTOMATED COUNT: 12.5 % (ref 12.3–15.4)
GFR SERPL CREATININE-BSD FRML MDRD: 86 ML/MIN/1.73
GLUCOSE SERPL-MCNC: 96 MG/DL (ref 65–99)
HCT VFR BLD AUTO: 46.2 % (ref 37.5–51)
HGB BLD-MCNC: 15.8 G/DL (ref 13–17.7)
MCH RBC QN AUTO: 31.5 PG (ref 26.6–33)
MCHC RBC AUTO-ENTMCNC: 34.2 G/DL (ref 31.5–35.7)
MCV RBC AUTO: 92.2 FL (ref 79–97)
PLATELET # BLD AUTO: 266 10*3/MM3 (ref 140–450)
PMV BLD AUTO: 9.5 FL (ref 6–12)
POTASSIUM SERPL-SCNC: 5.2 MMOL/L (ref 3.5–5.2)
RBC # BLD AUTO: 5.01 10*6/MM3 (ref 4.14–5.8)
SARS-COV-2 ORF1AB RESP QL NAA+PROBE: NOT DETECTED
SODIUM SERPL-SCNC: 139 MMOL/L (ref 136–145)
WBC NRBC COR # BLD: 8.04 10*3/MM3 (ref 3.4–10.8)

## 2021-12-16 PROCEDURE — C9803 HOPD COVID-19 SPEC COLLECT: HCPCS

## 2021-12-16 PROCEDURE — 36415 COLL VENOUS BLD VENIPUNCTURE: CPT

## 2021-12-16 PROCEDURE — 80048 BASIC METABOLIC PNL TOTAL CA: CPT

## 2021-12-16 PROCEDURE — U0004 COV-19 TEST NON-CDC HGH THRU: HCPCS

## 2021-12-16 PROCEDURE — 85027 COMPLETE CBC AUTOMATED: CPT

## 2021-12-17 ENCOUNTER — HOSPITAL ENCOUNTER (OUTPATIENT)
Facility: HOSPITAL | Age: 56
Setting detail: HOSPITAL OUTPATIENT SURGERY
Discharge: HOME | End: 2021-12-17
Attending: ORTHOPAEDIC SURGERY | Admitting: ORTHOPAEDIC SURGERY

## 2021-12-17 ENCOUNTER — ANESTHESIA (OUTPATIENT)
Dept: PERIOP | Facility: HOSPITAL | Age: 56
End: 2021-12-17

## 2021-12-17 VITALS
SYSTOLIC BLOOD PRESSURE: 138 MMHG | TEMPERATURE: 97.5 F | RESPIRATION RATE: 16 BRPM | DIASTOLIC BLOOD PRESSURE: 65 MMHG | OXYGEN SATURATION: 93 % | HEART RATE: 70 BPM

## 2021-12-17 DIAGNOSIS — S46.011D TRAUMATIC COMPLETE TEAR OF RIGHT ROTATOR CUFF, SUBSEQUENT ENCOUNTER: ICD-10-CM

## 2021-12-17 DIAGNOSIS — S46.011D TRAUMATIC COMPLETE TEAR OF RIGHT ROTATOR CUFF, SUBSEQUENT ENCOUNTER: Primary | ICD-10-CM

## 2021-12-17 PROCEDURE — 25010000002 ONDANSETRON PER 1 MG: Performed by: NURSE ANESTHETIST, CERTIFIED REGISTERED

## 2021-12-17 PROCEDURE — C9290 INJ, BUPIVACAINE LIPOSOME: HCPCS | Performed by: ORTHOPAEDIC SURGERY

## 2021-12-17 PROCEDURE — 25010000002 DEXAMETHASONE PER 1 MG: Performed by: NURSE ANESTHETIST, CERTIFIED REGISTERED

## 2021-12-17 PROCEDURE — 25010000002 EPINEPHRINE PER 0.1 MG: Performed by: ORTHOPAEDIC SURGERY

## 2021-12-17 PROCEDURE — 25010000002 ROPIVACAINE PER 1 MG: Performed by: ORTHOPAEDIC SURGERY

## 2021-12-17 PROCEDURE — 29824 SHO ARTHRS SRG DSTL CLAVICLC: CPT | Performed by: ORTHOPAEDIC SURGERY

## 2021-12-17 PROCEDURE — 25010000002 PHENYLEPHRINE 10 MG/ML SOLUTION: Performed by: NURSE ANESTHETIST, CERTIFIED REGISTERED

## 2021-12-17 PROCEDURE — 0 MEPIVACAINE HCL (PF) 1.5 % SOLUTION: Performed by: ANESTHESIOLOGY

## 2021-12-17 PROCEDURE — C1713 ANCHOR/SCREW BN/BN,TIS/BN: HCPCS | Performed by: ORTHOPAEDIC SURGERY

## 2021-12-17 PROCEDURE — S0260 H&P FOR SURGERY: HCPCS | Performed by: ORTHOPAEDIC SURGERY

## 2021-12-17 PROCEDURE — L3670 SO ACRO/CLAV CAN WEB PRE OTS: HCPCS | Performed by: ORTHOPAEDIC SURGERY

## 2021-12-17 PROCEDURE — 25010000002 MIDAZOLAM PER 1 MG: Performed by: ANESTHESIOLOGY

## 2021-12-17 PROCEDURE — 25010000002 FENTANYL CITRATE (PF) 50 MCG/ML SOLUTION: Performed by: ANESTHESIOLOGY

## 2021-12-17 PROCEDURE — 23412 REPAIR ROTATOR CUFF CHRONIC: CPT | Performed by: ORTHOPAEDIC SURGERY

## 2021-12-17 PROCEDURE — 0 CEFAZOLIN IN DEXTROSE 2-4 GM/100ML-% SOLUTION: Performed by: ORTHOPAEDIC SURGERY

## 2021-12-17 PROCEDURE — 25010000002 ROPIVACAINE PER 1 MG: Performed by: ANESTHESIOLOGY

## 2021-12-17 PROCEDURE — C1889 IMPLANT/INSERT DEVICE, NOC: HCPCS | Performed by: ORTHOPAEDIC SURGERY

## 2021-12-17 PROCEDURE — 76942 ECHO GUIDE FOR BIOPSY: CPT | Performed by: ORTHOPAEDIC SURGERY

## 2021-12-17 PROCEDURE — 25010000002 PROPOFOL 10 MG/ML EMULSION: Performed by: NURSE ANESTHETIST, CERTIFIED REGISTERED

## 2021-12-17 PROCEDURE — 0 BUPIVACAINE LIPOSOME 1.3 % SUSPENSION: Performed by: ORTHOPAEDIC SURGERY

## 2021-12-17 DEVICE — SUT NONABS MAXBRAID/PE NMBR2 C7 38IN BLU 900335: Type: IMPLANTABLE DEVICE | Site: SHOULDER | Status: FUNCTIONAL

## 2021-12-17 DEVICE — SUT/ANCH VENTIX PEEK KNOTLSS 4X7.5MM: Type: IMPLANTABLE DEVICE | Site: SHOULDER | Status: FUNCTIONAL

## 2021-12-17 DEVICE — DEV CONTRL TISS STRATAFIXSPIRALMNCRYL PLSPS2 REV3/0 45CM: Type: IMPLANTABLE DEVICE | Site: SHOULDER | Status: FUNCTIONAL

## 2021-12-17 RX ORDER — ONDANSETRON 2 MG/ML
4 INJECTION INTRAMUSCULAR; INTRAVENOUS ONCE AS NEEDED
Status: DISCONTINUED | OUTPATIENT
Start: 2021-12-17 | End: 2021-12-17 | Stop reason: HOSPADM

## 2021-12-17 RX ORDER — PROPOFOL 10 MG/ML
VIAL (ML) INTRAVENOUS AS NEEDED
Status: DISCONTINUED | OUTPATIENT
Start: 2021-12-17 | End: 2021-12-17 | Stop reason: SURG

## 2021-12-17 RX ORDER — ONDANSETRON 2 MG/ML
INJECTION INTRAMUSCULAR; INTRAVENOUS AS NEEDED
Status: DISCONTINUED | OUTPATIENT
Start: 2021-12-17 | End: 2021-12-17 | Stop reason: SURG

## 2021-12-17 RX ORDER — MIDAZOLAM HYDROCHLORIDE 1 MG/ML
2 INJECTION INTRAMUSCULAR; INTRAVENOUS ONCE
Status: COMPLETED | OUTPATIENT
Start: 2021-12-17 | End: 2021-12-17

## 2021-12-17 RX ORDER — DEXAMETHASONE SODIUM PHOSPHATE 10 MG/ML
INJECTION INTRAMUSCULAR; INTRAVENOUS AS NEEDED
Status: DISCONTINUED | OUTPATIENT
Start: 2021-12-17 | End: 2021-12-17 | Stop reason: SURG

## 2021-12-17 RX ORDER — EPHEDRINE SULFATE 50 MG/ML
5 INJECTION, SOLUTION INTRAVENOUS ONCE AS NEEDED
Status: DISCONTINUED | OUTPATIENT
Start: 2021-12-17 | End: 2021-12-17 | Stop reason: HOSPADM

## 2021-12-17 RX ORDER — PHENYLEPHRINE HYDROCHLORIDE 10 MG/ML
INJECTION INTRAVENOUS AS NEEDED
Status: DISCONTINUED | OUTPATIENT
Start: 2021-12-17 | End: 2021-12-17 | Stop reason: SURG

## 2021-12-17 RX ORDER — ROPIVACAINE HYDROCHLORIDE 5 MG/ML
INJECTION, SOLUTION EPIDURAL; INFILTRATION; PERINEURAL AS NEEDED
Status: DISCONTINUED | OUTPATIENT
Start: 2021-12-17 | End: 2021-12-17 | Stop reason: HOSPADM

## 2021-12-17 RX ORDER — ASPIRIN 325 MG
325 TABLET, DELAYED RELEASE (ENTERIC COATED) ORAL DAILY
Qty: 30 TABLET | Refills: 0 | Status: SHIPPED | OUTPATIENT
Start: 2021-12-17

## 2021-12-17 RX ORDER — SODIUM CHLORIDE 0.9 % (FLUSH) 0.9 %
3 SYRINGE (ML) INJECTION EVERY 12 HOURS SCHEDULED
Status: DISCONTINUED | OUTPATIENT
Start: 2021-12-17 | End: 2021-12-17 | Stop reason: HOSPADM

## 2021-12-17 RX ORDER — HYDROMORPHONE HYDROCHLORIDE 1 MG/ML
0.5 INJECTION, SOLUTION INTRAMUSCULAR; INTRAVENOUS; SUBCUTANEOUS
Status: DISCONTINUED | OUTPATIENT
Start: 2021-12-17 | End: 2021-12-17 | Stop reason: HOSPADM

## 2021-12-17 RX ORDER — LIDOCAINE HYDROCHLORIDE 10 MG/ML
0.5 INJECTION, SOLUTION EPIDURAL; INFILTRATION; INTRACAUDAL; PERINEURAL ONCE AS NEEDED
Status: DISCONTINUED | OUTPATIENT
Start: 2021-12-17 | End: 2021-12-17 | Stop reason: HOSPADM

## 2021-12-17 RX ORDER — FENTANYL CITRATE 50 UG/ML
50 INJECTION, SOLUTION INTRAMUSCULAR; INTRAVENOUS
Status: DISCONTINUED | OUTPATIENT
Start: 2021-12-17 | End: 2021-12-17 | Stop reason: HOSPADM

## 2021-12-17 RX ORDER — LIDOCAINE HYDROCHLORIDE 20 MG/ML
INJECTION, SOLUTION INFILTRATION; PERINEURAL AS NEEDED
Status: DISCONTINUED | OUTPATIENT
Start: 2021-12-17 | End: 2021-12-17 | Stop reason: SURG

## 2021-12-17 RX ORDER — HYDROCODONE BITARTRATE AND ACETAMINOPHEN 5; 325 MG/1; MG/1
1 TABLET ORAL ONCE AS NEEDED
Status: DISCONTINUED | OUTPATIENT
Start: 2021-12-17 | End: 2021-12-17 | Stop reason: HOSPADM

## 2021-12-17 RX ORDER — ONDANSETRON 4 MG/1
4 TABLET, FILM COATED ORAL EVERY 6 HOURS PRN
Qty: 30 TABLET | Refills: 0 | Status: SHIPPED | OUTPATIENT
Start: 2021-12-17 | End: 2022-03-11

## 2021-12-17 RX ORDER — SODIUM CHLORIDE 0.9 % (FLUSH) 0.9 %
3-10 SYRINGE (ML) INJECTION AS NEEDED
Status: DISCONTINUED | OUTPATIENT
Start: 2021-12-17 | End: 2021-12-17 | Stop reason: HOSPADM

## 2021-12-17 RX ORDER — FENTANYL CITRATE 50 UG/ML
50 INJECTION, SOLUTION INTRAMUSCULAR; INTRAVENOUS ONCE
Status: COMPLETED | OUTPATIENT
Start: 2021-12-17 | End: 2021-12-17

## 2021-12-17 RX ORDER — FLUMAZENIL 0.1 MG/ML
0.2 INJECTION INTRAVENOUS AS NEEDED
Status: DISCONTINUED | OUTPATIENT
Start: 2021-12-17 | End: 2021-12-17 | Stop reason: HOSPADM

## 2021-12-17 RX ORDER — CEFAZOLIN SODIUM 2 G/100ML
2 INJECTION, SOLUTION INTRAVENOUS ONCE
Status: COMPLETED | OUTPATIENT
Start: 2021-12-17 | End: 2021-12-17

## 2021-12-17 RX ORDER — SODIUM CHLORIDE, SODIUM LACTATE, POTASSIUM CHLORIDE, CALCIUM CHLORIDE 600; 310; 30; 20 MG/100ML; MG/100ML; MG/100ML; MG/100ML
9 INJECTION, SOLUTION INTRAVENOUS CONTINUOUS
Status: DISCONTINUED | OUTPATIENT
Start: 2021-12-17 | End: 2021-12-17 | Stop reason: HOSPADM

## 2021-12-17 RX ORDER — ROPIVACAINE HYDROCHLORIDE 5 MG/ML
INJECTION, SOLUTION EPIDURAL; INFILTRATION; PERINEURAL
Status: COMPLETED | OUTPATIENT
Start: 2021-12-17 | End: 2021-12-17

## 2021-12-17 RX ORDER — MIDAZOLAM HYDROCHLORIDE 1 MG/ML
1 INJECTION INTRAMUSCULAR; INTRAVENOUS
Status: DISCONTINUED | OUTPATIENT
Start: 2021-12-17 | End: 2021-12-17 | Stop reason: HOSPADM

## 2021-12-17 RX ORDER — HYDROCODONE BITARTRATE AND ACETAMINOPHEN 7.5; 325 MG/1; MG/1
1 TABLET ORAL EVERY 4 HOURS PRN
Status: DISCONTINUED | OUTPATIENT
Start: 2021-12-17 | End: 2021-12-17 | Stop reason: HOSPADM

## 2021-12-17 RX ORDER — FENTANYL CITRATE 50 UG/ML
100 INJECTION, SOLUTION INTRAMUSCULAR; INTRAVENOUS
Status: DISCONTINUED | OUTPATIENT
Start: 2021-12-17 | End: 2021-12-17 | Stop reason: HOSPADM

## 2021-12-17 RX ORDER — DOCUSATE SODIUM 100 MG/1
100 CAPSULE, LIQUID FILLED ORAL 2 TIMES DAILY
Qty: 60 CAPSULE | Refills: 0 | Status: SHIPPED | OUTPATIENT
Start: 2021-12-17 | End: 2022-03-11

## 2021-12-17 RX ORDER — OXYCODONE AND ACETAMINOPHEN 7.5; 325 MG/1; MG/1
TABLET ORAL
Qty: 40 TABLET | Refills: 0 | Status: SHIPPED | OUTPATIENT
Start: 2021-12-17 | End: 2022-01-02 | Stop reason: SDUPTHER

## 2021-12-17 RX ADMIN — FENTANYL CITRATE 50 MCG: 0.05 INJECTION, SOLUTION INTRAMUSCULAR; INTRAVENOUS at 10:39

## 2021-12-17 RX ADMIN — ROPIVACAINE HYDROCHLORIDE 15 ML: 5 INJECTION EPIDURAL; INFILTRATION; PERINEURAL at 10:46

## 2021-12-17 RX ADMIN — ONDANSETRON 4 MG: 2 INJECTION INTRAMUSCULAR; INTRAVENOUS at 11:10

## 2021-12-17 RX ADMIN — PHENYLEPHRINE HYDROCHLORIDE 100 MCG: 10 INJECTION, SOLUTION INTRAVENOUS at 11:08

## 2021-12-17 RX ADMIN — DEXAMETHASONE SODIUM PHOSPHATE 8 MG: 10 INJECTION INTRAMUSCULAR; INTRAVENOUS at 11:05

## 2021-12-17 RX ADMIN — PROPOFOL 250 MG: 10 INJECTION, EMULSION INTRAVENOUS at 10:57

## 2021-12-17 RX ADMIN — SODIUM CHLORIDE, POTASSIUM CHLORIDE, SODIUM LACTATE AND CALCIUM CHLORIDE: 600; 310; 30; 20 INJECTION, SOLUTION INTRAVENOUS at 10:49

## 2021-12-17 RX ADMIN — CEFAZOLIN SODIUM 1 G: 2 INJECTION, SOLUTION INTRAVENOUS at 11:24

## 2021-12-17 RX ADMIN — CEFAZOLIN SODIUM 2 G: 2 INJECTION, SOLUTION INTRAVENOUS at 11:01

## 2021-12-17 RX ADMIN — LIDOCAINE HYDROCHLORIDE 60 MG: 20 INJECTION, SOLUTION INFILTRATION; PERINEURAL at 10:57

## 2021-12-17 RX ADMIN — MEPIVACAINE HYDROCHLORIDE 15 ML: 15 INJECTION, SOLUTION EPIDURAL; INFILTRATION at 10:46

## 2021-12-17 RX ADMIN — MIDAZOLAM 1 MG: 1 INJECTION INTRAMUSCULAR; INTRAVENOUS at 10:39

## 2021-12-17 NOTE — ANESTHESIA POSTPROCEDURE EVALUATION
Patient: YAN Morris    Procedure Summary     Date: 12/17/21 Room / Location:  [facility]  /  [facility]    Anesthesia Start: 1049 Anesthesia Stop: 1218    Procedure: Right shoulder arthroscopy with mini open rotator cuff repair with possible patch augmentation (Right Shoulder) Diagnosis:       Traumatic complete tear of right rotator cuff, subsequent encounter      (Traumatic complete tear of right rotator cuff, subsequent encounter [S46.011D])    Surgeons: Dandre Titus MD Provider: Pasquale Montgomery MD    Anesthesia Type: general with block ASA Status: 3          Anesthesia Type: general with block    Vitals  Vitals Value Taken Time   /79 12/17/21 1316   Temp 36.4 °C (97.5 °F) 12/17/21 1315   Pulse 75 12/17/21 1316   Resp 20 12/17/21 1315   SpO2 93 % 12/17/21 1316   Vitals shown include unvalidated device data.        Post Anesthesia Care and Evaluation    Patient location during evaluation: bedside  Patient participation: complete - patient participated  Level of consciousness: awake  Pain management: adequate  Airway patency: patent  Anesthetic complications: No anesthetic complications  PONV Status: controlled  Cardiovascular status: acceptable  Respiratory status: acceptable  Hydration status: acceptable    Comments: ---------------------------               12/17/21                      1315         ---------------------------   BP:          130/79         Pulse:         70           Resp:          20           Temp:   36.4 °C (97.5 °F)   SpO2:          93%         ---------------------------

## 2021-12-17 NOTE — ANESTHESIA PROCEDURE NOTES
Airway  Urgency: elective    Date/Time: 12/17/2021 11:00 AM  Airway not difficult    General Information and Staff    Patient location during procedure: OR  Anesthesiologist: Pasquale Montgomery MD  CRNA: Bri Valle CRNA    Indications and Patient Condition  Indications for airway management: airway protection    Preoxygenated: yes (Pt pre-O2 with 100% O2)  Mask difficulty assessment: 0 - not attempted    Final Airway Details  Final airway type: supraglottic airway      Successful airway: classic  Size 5    Number of attempts at approach: 1  Assessment: lips, teeth, and gum same as pre-op and atraumatic intubation    Additional Comments  ATOLMA x1. No change in dentition. + ETCO2. Airway seal pressure <20cm H2O.

## 2021-12-17 NOTE — ANESTHESIA PROCEDURE NOTES
Peripheral Block    Pre-sedation assessment completed: 12/17/2021 10:40 AM    Patient reassessed immediately prior to procedure    Patient location during procedure: pre-op  Start time: 12/17/2021 10:41 AM  Stop time: 12/17/2021 10:43 AM  Reason for block: at surgeon's request and post-op pain management  Performed by  Anesthesiologist: Neftali Prieto MD  Preanesthetic Checklist  Completed: patient identified, IV checked, site marked, risks and benefits discussed, surgical consent, monitors and equipment checked, pre-op evaluation and timeout performed  Prep:  Sterile barriers:gloves  Prep: ChloraPrep  Patient monitoring: blood pressure monitoring, continuous pulse oximetry and EKG  Procedure    Sedation: yes  Performed under: local infiltration  Guidance:ultrasound guided    ULTRASOUND INTERPRETATION.  Using ultrasound guidance a 22 G gauge needle was placed in close proximity to the brachial plexus nerve, at which point, under ultrasound guidance anesthetic was injected in the area of the nerve and spread of the anesthesia was seen on ultrasound in close proximity thereto.  There were no abnormalities seen on ultrasound; a digital image was taken; and the patient tolerated the procedure with no complications. Images:still images obtained    Laterality:right  Block Type:interscalene  Injection Technique:single-shot  Needle Type:short-bevel  Needle Gauge:22 G      Medications Used: Mepivacaine HCl (PF) (CARBOCAINE) 1.5 % injection, 15 mL  ropivacaine (NAROPIN) 0.5 % injection, 15 mL      Medications  Comment:.    Post Assessment  Injection Assessment: negative aspiration for heme, no paresthesia on injection and incremental injection  Patient Tolerance:comfortable throughout block  Complications:no  Additional Notes  Ultrasound Interpretation:  Using ultrasound guidance the needle was placed in close proximity to the nerve and anesthetic was injected in the area of the nerve and spread of the anesthetic was seen  on ultrasound in close proximity thereto.  There were no abnormalities seen on ultrasound; a digital image was taken; and the patient tolerated the procedure with no complications.

## 2021-12-23 ENCOUNTER — OFFICE VISIT (OUTPATIENT)
Dept: ORTHOPEDIC SURGERY | Facility: CLINIC | Age: 56
End: 2021-12-23

## 2021-12-23 VITALS — TEMPERATURE: 97.8 F | HEIGHT: 74 IN | BODY MASS INDEX: 37.22 KG/M2 | WEIGHT: 290 LBS

## 2021-12-23 DIAGNOSIS — Z98.890 S/P ROTATOR CUFF SURGERY: Primary | ICD-10-CM

## 2021-12-23 PROCEDURE — 99024 POSTOP FOLLOW-UP VISIT: CPT | Performed by: ORTHOPAEDIC SURGERY

## 2021-12-23 NOTE — PROGRESS NOTES
OTHER POST OP GLOBAL     NAME: YAN Morris  ?  : 1965  ?  MRN: [id number]    Chief Complaint   Patient presents with   • Right Shoulder - Post-op     ?  Date of surgery: 2021    HPI:   Patient returns today for 6 day follow up of right rotator cuff repair. Arthroscopic portals healing nicely/as expected with no signs of infection. Patient reports doing well with no unusual complaints. Appears to be progressing appropriately.  He states this time around his shoulder feels a lot better and a lot different than the last time around.      Ortho Exam:   Right shoulder. The patient is status-post rotator cuff repair 6 day(s) . Incision is clean and healing well. Arthroscopic portals are clean. Appropriate amounts of swelling and bruising are noted. The patients pain is well controlled. The passive range of motion is abduction 0-60 degrees, flexion 0-60 degrees. Axillary nerve function is well preserved. Radial artery pulses are palpable.      Diagnostic Studies:  no diagnostic testing performed this visit      Assessment:  Diagnoses and all orders for this visit:    1. S/P rotator cuff surgery (Primary)          Plan   • Incision care  • Continue ice as needed  • Active/Active Assisted ROM of elbow, wrist and hand  • Stretching and strengthening exercises  • Alternate Ibuprofen and Tylenol as needed  • Fall precautions and reinjury precautions discussed with the patient.  • He is currently off work and we are recommending that he should continue that status because he is not quite ready to go back and his recurrent injuries status is quite concerning.  • Follow up in 4-6 week(s)     Date of encounter: 2021  Dandre Titus MD

## 2022-01-02 DIAGNOSIS — S46.011D TRAUMATIC COMPLETE TEAR OF RIGHT ROTATOR CUFF, SUBSEQUENT ENCOUNTER: ICD-10-CM

## 2022-01-03 RX ORDER — OXYCODONE AND ACETAMINOPHEN 7.5; 325 MG/1; MG/1
TABLET ORAL
Qty: 40 TABLET | Refills: 0 | Status: SHIPPED | OUTPATIENT
Start: 2022-01-03 | End: 2022-01-04 | Stop reason: SDUPTHER

## 2022-01-04 DIAGNOSIS — S46.011D TRAUMATIC COMPLETE TEAR OF RIGHT ROTATOR CUFF, SUBSEQUENT ENCOUNTER: ICD-10-CM

## 2022-01-04 RX ORDER — OXYCODONE AND ACETAMINOPHEN 7.5; 325 MG/1; MG/1
TABLET ORAL
Qty: 40 TABLET | Refills: 0 | Status: SHIPPED | OUTPATIENT
Start: 2022-01-04 | End: 2022-01-19 | Stop reason: SDUPTHER

## 2022-01-19 DIAGNOSIS — S46.011D TRAUMATIC COMPLETE TEAR OF RIGHT ROTATOR CUFF, SUBSEQUENT ENCOUNTER: ICD-10-CM

## 2022-01-19 RX ORDER — OXYCODONE AND ACETAMINOPHEN 7.5; 325 MG/1; MG/1
TABLET ORAL
Qty: 40 TABLET | Refills: 0 | Status: SHIPPED | OUTPATIENT
Start: 2022-01-19 | End: 2022-03-11

## 2022-02-01 DIAGNOSIS — S46.011D TRAUMATIC COMPLETE TEAR OF RIGHT ROTATOR CUFF, SUBSEQUENT ENCOUNTER: Primary | ICD-10-CM

## 2022-02-02 NOTE — PROGRESS NOTES
OTHER POST OP GLOBAL     NAME: YAN Morris  ?  : 1965  ?  MRN: [id number]    CC: Follow-up on right rotator cuff repair surgery  ?  Date of surgery: Patient underwent right rotator cuff repair surgery on 2021    HPI:   Patient returns today for 7 week follow up of right rotator cuff repair. Incision(s) and Arthroscopic portals healed nicely with no signs of infection. Patient reports doing well with no unusual complaints. Appears to be progressing appropriately.  He has made excellent progress with his reoperation.  Range of motion has progressively improved with physical therapy.  He states that he is about ready to go on back to work at least on a light-duty status.      Ortho Exam:   Right shoulder. The patient is status-post rotator cuff repair 7 week(s) . Incision is clean and healing well. Arthroscopic portals are clean. Appropriate amounts of swelling and bruising are noted. The patients pain is well controlled. The passive range of motion is abduction 0-130 degrees, flexion 0-130 degrees. Axillary nerve function is well preserved. Radial artery pulses are palpable.      Diagnostic Studies:  no diagnostic testing performed this visit      Assessment:  Diagnoses and all orders for this visit:    1. S/P rotator cuff surgery (Primary)    2. Traumatic complete tear of right rotator cuff, subsequent encounter          Plan   • Incision care  • Continue ice as needed  • Aggressive ROM  • Stretching and strengthening exercises  • Tylenol 500-1000mg by mouth every 6 hours as needed for pain   • Fall precautions  • Follow up in 6 week(s)     Date of encounter: 2022  Dandre Titus MD

## 2022-02-03 ENCOUNTER — OFFICE VISIT (OUTPATIENT)
Dept: ORTHOPEDIC SURGERY | Facility: CLINIC | Age: 57
End: 2022-02-03

## 2022-02-03 DIAGNOSIS — S46.011D TRAUMATIC COMPLETE TEAR OF RIGHT ROTATOR CUFF, SUBSEQUENT ENCOUNTER: ICD-10-CM

## 2022-02-03 DIAGNOSIS — Z98.890 S/P ROTATOR CUFF SURGERY: Primary | ICD-10-CM

## 2022-02-03 PROCEDURE — 99024 POSTOP FOLLOW-UP VISIT: CPT | Performed by: ORTHOPAEDIC SURGERY

## 2022-02-22 DIAGNOSIS — Z00.00 ANNUAL PHYSICAL EXAM: Primary | ICD-10-CM

## 2022-02-22 DIAGNOSIS — E11.9 TYPE 2 DIABETES MELLITUS WITHOUT COMPLICATION, WITHOUT LONG-TERM CURRENT USE OF INSULIN: ICD-10-CM

## 2022-02-22 DIAGNOSIS — E78.2 MIXED HYPERLIPIDEMIA: ICD-10-CM

## 2022-03-07 ENCOUNTER — TELEPHONE (OUTPATIENT)
Dept: ORTHOPEDIC SURGERY | Facility: CLINIC | Age: 57
End: 2022-03-07

## 2022-03-07 NOTE — TELEPHONE ENCOUNTER
PATIENT CALLED AND IS REQUESTING A NOTE TO RETURN TO WORK ON 3/14/22.  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  HE STATED THAT THE NOTE CANNOT SAY LIGHT DUTY.  PATIENT IS S/P RIGHT ROTATOR CUFF REPAIR ON 12/17/21.      IS IT OK TO GIVE PATIENT NOTE TO RETURN TO WORK ON 3/14/22?

## 2022-03-11 ENCOUNTER — OFFICE VISIT (OUTPATIENT)
Dept: FAMILY MEDICINE CLINIC | Facility: CLINIC | Age: 57
End: 2022-03-11

## 2022-03-11 VITALS
OXYGEN SATURATION: 94 % | BODY MASS INDEX: 37.47 KG/M2 | HEIGHT: 74 IN | WEIGHT: 292 LBS | DIASTOLIC BLOOD PRESSURE: 68 MMHG | HEART RATE: 69 BPM | SYSTOLIC BLOOD PRESSURE: 116 MMHG

## 2022-03-11 DIAGNOSIS — Z00.00 ANNUAL PHYSICAL EXAM: Primary | ICD-10-CM

## 2022-03-11 PROCEDURE — 99396 PREV VISIT EST AGE 40-64: CPT | Performed by: NURSE PRACTITIONER

## 2022-03-11 NOTE — PROGRESS NOTES
"Chief Complaint  Annual Exam (& review labs)    Subjective          RL Alexandra presents to [facility]  Mr. Morris presents today for an annual physical exam with lab review.     I have reviewed the patient's medical history in detail and updated the computerized patient record.    Current Outpatient Medications:   •  aspirin  MG tablet, Take 1 tablet by mouth Daily., Disp: 30 tablet, Rfl: 0  •  lisinopril (PRINIVIL,ZESTRIL) 10 MG tablet, TAKE ONE TABLET BY MOUTH DAILY (Patient taking differently: Take 10 mg by mouth Daily.), Disp: 30 tablet, Rfl: 11     Review of Systems   Constitutional: Negative.    HENT: Negative.    Eyes: Negative.    Respiratory: Negative.    Cardiovascular: Negative.    Gastrointestinal: Negative.    Genitourinary: Negative.    Musculoskeletal: Positive for arthralgias and myalgias.   Skin: Negative.    Neurological: Negative.    Psychiatric/Behavioral: Negative.        Objective   Vital Signs:   /68 (BP Location: Right arm, Patient Position: Sitting, Cuff Size: Adult)   Pulse 69   Ht 188 cm (74\")   Wt 132 kg (292 lb)   SpO2 94%   BMI 37.49 kg/m²     Physical Exam  Vitals reviewed.   Constitutional:       Appearance: Normal appearance. He is obese.   Neck:      Vascular: No carotid bruit.   Cardiovascular:      Rate and Rhythm: Normal rate and regular rhythm.      Pulses: Normal pulses.      Heart sounds: Normal heart sounds.   Pulmonary:      Effort: Pulmonary effort is normal.      Breath sounds: Normal breath sounds.   Abdominal:      General: Bowel sounds are normal.      Palpations: Abdomen is soft.   Musculoskeletal:         General: Normal range of motion.      Cervical back: Normal range of motion and neck supple.   Skin:     General: Skin is warm and dry.   Neurological:      Mental Status: He is alert and oriented to person, place, and time.   Psychiatric:         Mood and Affect: Mood normal.         Behavior: Behavior normal.       "   Thought Content: Thought content normal.        Result Review :     Common labs    Common Labsle 7/15/21 7/15/21 12/16/21 12/16/21 3/3/22 3/3/22 3/3/22 3/3/22 3/3/22    1514 1514 1152 1152 0822 0822 0822 0822 0822   Glucose  90  96  107 (A)      BUN  18  13  19      Creatinine  1.12  0.92  0.90      eGFR Non  Am  68  86        Sodium  139  139  140      Potassium  4.8  5.2  4.8      Chloride  102  103  102      Calcium  10.1  9.7  9.6      Total Protein      7.3      Albumin      4.8      Total Bilirubin      0.4      Alkaline Phosphatase      64      AST (SGOT)      22      ALT (SGPT)      42      WBC 8.29  8.04  6.9       Hemoglobin 15.6  15.8  15.3       Hematocrit 46.7  46.2  45.4       Platelets 227  266  249       Total Cholesterol       212 (A)     Triglycerides       170 (A)     HDL Cholesterol       30 (A)     LDL Cholesterol        151 (A)     Hemoglobin A1C        5.7 (A)    Microalbumin, Urine         4.0   (A) Abnormal value       Comments are available for some flowsheets but are not being displayed.                     Assessment and Plan    Diagnoses and all orders for this visit:    1. Annual physical exam (Primary)    Mr. Morris appears to be doing well today.  His physical exam is within normal parameters for him today.  I have reviewed his labs with him today.   His lipid levels are elevated. He was not fasting on the day of his labs. He is to use diet and exercise to help manage his weight, and lipid levels.     Follow Up   Return in about 1 year (around 3/11/2023) for Annual physical, Fasting labs 1 week prior to next f/u.  Patient was given instructions and counseling regarding his condition or for health maintenance advice. Please see specific information pulled into the AVS if appropriate.     Patient has been erroneously marked as diabetic. Based on the available clinical information, he does not have diabetes and should therefore be excluded from diabetic health maintenance and  quality measures for the remainder of the reporting period.

## 2022-03-17 ENCOUNTER — OFFICE VISIT (OUTPATIENT)
Dept: ORTHOPEDIC SURGERY | Facility: CLINIC | Age: 57
End: 2022-03-17

## 2022-03-17 VITALS — HEIGHT: 74 IN | BODY MASS INDEX: 36.57 KG/M2 | WEIGHT: 285 LBS | TEMPERATURE: 97.1 F

## 2022-03-17 DIAGNOSIS — Z98.890 S/P ROTATOR CUFF SURGERY: ICD-10-CM

## 2022-03-17 DIAGNOSIS — Z98.890 S/P ARTHROSCOPY OF RIGHT SHOULDER: Primary | ICD-10-CM

## 2022-03-17 PROCEDURE — 99024 POSTOP FOLLOW-UP VISIT: CPT | Performed by: ORTHOPAEDIC SURGERY

## 2022-03-17 NOTE — PROGRESS NOTES
OTHER POST OP GLOBAL     NAME: YAN Morris  ?  : 1965  ?  MRN: [id number]    Chief Complaint   Patient presents with   • Right Shoulder - Post-op, Follow-up     ?  Date of surgery: 21    HPI:   Patient returns today for 3 month follow up of right rotator cuff repair. Arthroscopic portals healed nicely with no signs of infection. Patient reports doing well with no unusual complaints. Appears to be progressing appropriately.  He has made excellent progress with the physical therapy.  He has gone back to work and is slowly getting back into his regular duties without any difficulty.  He states that his recovery from the revision surgery for the rotator cuff repair has been quite dramatic and much better than before.      Ortho Exam:   Right shoulder. The patient is status-post rotator cuff repair 3 month(s) . Incision is clean and healing well. Arthroscopic portals are clean. Appropriate amounts of swelling and bruising are noted. The patients pain is well controlled. The passive range of motion is abduction 0-130 degrees, flexion 0-130 degrees. Axillary nerve function is well preserved. Radial artery pulses are palpable.      Diagnostic Studies:  no diagnostic testing performed this visit      Assessment:  Diagnoses and all orders for this visit:    1. S/P arthroscopy of right shoulder (Primary)    2. S/P rotator cuff surgery          Plan   • Incision care  • Continue ice as needed  • Aggressive ROM  • Stretching and strengthening exercises  • Alternate Ibuprofen and Tylenol as needed  • Fall precautions  • Follow up in 3 month(s)     Date of encounter: 2022  Dandre Titus MD

## 2022-03-23 PROBLEM — Z98.890 S/P ARTHROSCOPY OF RIGHT SHOULDER: Status: ACTIVE | Noted: 2022-03-23

## 2022-04-05 DIAGNOSIS — I10 ESSENTIAL HYPERTENSION: ICD-10-CM

## 2022-04-05 RX ORDER — LISINOPRIL 10 MG/1
10 TABLET ORAL DAILY
Qty: 30 TABLET | Refills: 11 | Status: SHIPPED | OUTPATIENT
Start: 2022-04-05 | End: 2023-02-21

## 2022-04-06 DIAGNOSIS — I10 ESSENTIAL HYPERTENSION: ICD-10-CM

## 2022-04-06 RX ORDER — LISINOPRIL 10 MG/1
TABLET ORAL
Qty: 30 TABLET | Refills: 11 | OUTPATIENT
Start: 2022-04-06

## 2022-04-14 ENCOUNTER — OFFICE VISIT (OUTPATIENT)
Dept: FAMILY MEDICINE CLINIC | Facility: CLINIC | Age: 57
End: 2022-04-14

## 2022-04-14 VITALS
HEART RATE: 77 BPM | DIASTOLIC BLOOD PRESSURE: 78 MMHG | BODY MASS INDEX: 37.73 KG/M2 | SYSTOLIC BLOOD PRESSURE: 116 MMHG | WEIGHT: 294 LBS | OXYGEN SATURATION: 96 % | HEIGHT: 74 IN

## 2022-04-14 DIAGNOSIS — G47.00 INSOMNIA, UNSPECIFIED TYPE: ICD-10-CM

## 2022-04-14 DIAGNOSIS — F41.1 GAD (GENERALIZED ANXIETY DISORDER): Primary | ICD-10-CM

## 2022-04-14 PROCEDURE — 99214 OFFICE O/P EST MOD 30 MIN: CPT | Performed by: NURSE PRACTITIONER

## 2022-04-14 RX ORDER — DULOXETIN HYDROCHLORIDE 30 MG/1
30 CAPSULE, DELAYED RELEASE ORAL DAILY
Qty: 90 CAPSULE | Refills: 0 | Status: SHIPPED | OUTPATIENT
Start: 2022-04-14 | End: 2022-05-31 | Stop reason: ALTCHOICE

## 2022-04-14 RX ORDER — AMITRIPTYLINE HYDROCHLORIDE 10 MG/1
10 TABLET, FILM COATED ORAL NIGHTLY
Qty: 90 TABLET | Refills: 0 | Status: SHIPPED | OUTPATIENT
Start: 2022-04-14 | End: 2022-05-31 | Stop reason: DRUGHIGH

## 2022-04-14 NOTE — PROGRESS NOTES
"Chief Complaint  Anxiety and Insomnia    Subjective          RL Alexandra presents to [facility]  Anxiety  Presents for initial visit. Onset was at an unknown time. The problem has been gradually worsening. Symptoms include decreased concentration, excessive worry, nervous/anxious behavior, palpitations (sometimes), panic (sometimes), restlessness and shortness of breath. Patient reports no depressed mood. Symptoms occur most days. The severity of symptoms is causing significant distress. The quality of sleep is poor. Nighttime awakenings: several.     Risk factors include prior traumatic experience. Past treatments include nothing.     I have reviewed the patient's medical history in detail and updated the computerized patient record.    Current Outpatient Medications:   •  aspirin  MG tablet, Take 1 tablet by mouth Daily., Disp: 30 tablet, Rfl: 0  •  lisinopril (PRINIVIL,ZESTRIL) 10 MG tablet, Take 1 tablet by mouth Daily., Disp: 30 tablet, Rfl: 11  •  amitriptyline (ELAVIL) 10 MG tablet, Take 1 tablet by mouth Every Night., Disp: 90 tablet, Rfl: 0  •  DULoxetine (CYMBALTA) 30 MG capsule, Take 1 capsule by mouth Daily., Disp: 90 capsule, Rfl: 0  Objective   Vital Signs:   /78 (BP Location: Right arm, Patient Position: Sitting, Cuff Size: Adult)   Pulse 77   Ht 188 cm (74\")   Wt 133 kg (294 lb)   SpO2 96%   BMI 37.75 kg/m²            Physical Exam  Constitutional:       Appearance: Normal appearance.   Neurological:      Mental Status: He is alert and oriented to person, place, and time.   Psychiatric:         Attention and Perception: Attention and perception normal.         Mood and Affect: Affect normal. Mood is anxious.         Speech: Speech normal.         Behavior: Behavior normal. Behavior is cooperative.         Thought Content: Thought content normal.         Cognition and Memory: Cognition and memory normal.         Judgment: Judgment normal.        Result " Review :                 Assessment and Plan    Diagnoses and all orders for this visit:    1. ROGERS (generalized anxiety disorder) (Primary)  -     DULoxetine (CYMBALTA) 30 MG capsule; Take 1 capsule by mouth Daily.  Dispense: 90 capsule; Refill: 0    2. Insomnia, unspecified type  -     amitriptyline (ELAVIL) 10 MG tablet; Take 1 tablet by mouth Every Night.  Dispense: 90 tablet; Refill: 0    Mr. Morris appears to be doing well.  He reports he has been in denial about his anxiety for at least 20 years since coming back from [place]. He is having difficulty sleeping and states it is time to admit he has anxiety.   I will start him on Duloxetine 30 mg daily for his anxiety. He is to also start Amitriptyline 10 mg as needed for insomnia.   Return in 6 weeks to follow up on starting new medication.     Follow Up   Return in about 6 weeks (around 5/26/2022) for medication follow up.  Patient was given instructions and counseling regarding his condition or for health maintenance advice. Please see specific information pulled into the AVS if appropriate.

## 2022-05-31 ENCOUNTER — OFFICE VISIT (OUTPATIENT)
Dept: FAMILY MEDICINE CLINIC | Facility: CLINIC | Age: 57
End: 2022-05-31

## 2022-05-31 VITALS
HEIGHT: 74 IN | HEART RATE: 73 BPM | DIASTOLIC BLOOD PRESSURE: 70 MMHG | WEIGHT: 291 LBS | OXYGEN SATURATION: 95 % | BODY MASS INDEX: 37.35 KG/M2 | SYSTOLIC BLOOD PRESSURE: 112 MMHG

## 2022-05-31 DIAGNOSIS — F41.1 GAD (GENERALIZED ANXIETY DISORDER): Primary | ICD-10-CM

## 2022-05-31 DIAGNOSIS — G47.00 INSOMNIA, UNSPECIFIED TYPE: ICD-10-CM

## 2022-05-31 PROCEDURE — 99214 OFFICE O/P EST MOD 30 MIN: CPT | Performed by: NURSE PRACTITIONER

## 2022-05-31 RX ORDER — SERTRALINE HYDROCHLORIDE 25 MG/1
25 TABLET, FILM COATED ORAL DAILY
Qty: 90 TABLET | Refills: 1 | Status: SHIPPED | OUTPATIENT
Start: 2022-05-31 | End: 2022-07-22 | Stop reason: SDUPTHER

## 2022-05-31 RX ORDER — AMITRIPTYLINE HYDROCHLORIDE 25 MG/1
25 TABLET, FILM COATED ORAL NIGHTLY
Qty: 90 TABLET | Refills: 1 | Status: SHIPPED | OUTPATIENT
Start: 2022-05-31

## 2022-05-31 NOTE — PROGRESS NOTES
"Chief Complaint  6 week f/u on cymbalta    Subjective        RL Alexandra presents to [facility]  Does not feel that the duloxetine is helping. He is currently taking Duloxetine 30 mg twice a day. Ampritriptyline 10 mg is hit or miss. Still unable to sleep at times.     I have reviewed the patient's medical history in detail and updated the computerized patient record.    Current Outpatient Medications:   •  aspirin  MG tablet, Take 1 tablet by mouth Daily., Disp: 30 tablet, Rfl: 0  •  lisinopril (PRINIVIL,ZESTRIL) 10 MG tablet, Take 1 tablet by mouth Daily., Disp: 30 tablet, Rfl: 11  •  amitriptyline (ELAVIL) 25 MG tablet, Take 1 tablet by mouth Every Night., Disp: 90 tablet, Rfl: 1  •  sertraline (ZOLOFT) 25 MG tablet, Take 1 tablet by mouth Daily., Disp: 90 tablet, Rfl: 1     Objective   Vital Signs:  /70   Pulse 73   Ht 188 cm (74\")   Wt 132 kg (291 lb)   SpO2 95%   BMI 37.36 kg/m²           Physical Exam  Vitals reviewed.   Constitutional:       Appearance: Normal appearance.   Cardiovascular:      Rate and Rhythm: Normal rate and regular rhythm.      Pulses: Normal pulses.      Heart sounds: Normal heart sounds.   Pulmonary:      Effort: Pulmonary effort is normal.      Breath sounds: Normal breath sounds.   Skin:     General: Skin is warm and dry.   Neurological:      Mental Status: He is alert and oriented to person, place, and time.   Psychiatric:         Mood and Affect: Mood is depressed. Affect is flat.         Speech: Speech normal.         Behavior: Behavior normal. Behavior is cooperative.         Thought Content: Thought content normal.         Cognition and Memory: Cognition and memory normal.         Judgment: Judgment normal.        Result Review :                Assessment and Plan   Diagnoses and all orders for this visit:    1. ROGERS (generalized anxiety disorder) (Primary)  -     sertraline (ZOLOFT) 25 MG tablet; Take 1 tablet by mouth Daily.  " Dispense: 90 tablet; Refill: 1    2. Insomnia, unspecified type  -     amitriptyline (ELAVIL) 25 MG tablet; Take 1 tablet by mouth Every Night.  Dispense: 90 tablet; Refill: 1    Mr. Morris appears to be depressed today.  He is to wean off of the Duloxetine as instructed and start Sertraline 25 mg as instructed while weaning off of the duloxetine.  I will increase the Amitriptyline form 10 mg nightly to 25 mg nightly.  I have asked him to follow up in 8 weeks on the changes in his medications or sooner if needed.          Follow Up   Return in about 8 weeks (around 7/26/2022), or if symptoms worsen or fail to improve, for f/u on anxiety.  Patient was given instructions and counseling regarding his condition or for health maintenance advice. Please see specific information pulled into the AVS if appropriate.

## 2022-06-16 ENCOUNTER — OFFICE VISIT (OUTPATIENT)
Dept: ORTHOPEDIC SURGERY | Facility: CLINIC | Age: 57
End: 2022-06-16

## 2022-06-16 VITALS — BODY MASS INDEX: 36.57 KG/M2 | WEIGHT: 285 LBS | HEIGHT: 74 IN

## 2022-06-16 DIAGNOSIS — Z98.890 S/P ROTATOR CUFF SURGERY: Primary | ICD-10-CM

## 2022-06-16 DIAGNOSIS — Z98.890 S/P ARTHROSCOPY OF RIGHT SHOULDER: ICD-10-CM

## 2022-06-16 PROCEDURE — 99213 OFFICE O/P EST LOW 20 MIN: CPT | Performed by: ORTHOPAEDIC SURGERY

## 2022-06-16 NOTE — PROGRESS NOTES
"Chief Complaint  Follow-up of the Right Shoulder    Subjective    History of Present Illness      YAN Morris is a 57 y.o. male who presents to [facility] for follow-up on rotator cuff repair surgery.  History of Present Illness this patient is now 7 months out from a rotator cuff repair.  Unfortunately after his first surgery he developed a sudden trauma to the shoulder and a gentleman fell into him at work.  He had to go back and have a second rotator cuff repair performed.  Subsequent to that surgery he has done extremely well.  He states \"my shoulder is functioning absolutely great \".  The patient has gone back to work full-time.  He did not have any limitations range of motion.  The strength is returning back quite nicely.  Physical therapy helped him tremendously to improve his symptoms.  The patient states that he is very pleased with his outcome.  Pain Location: RIGHT shoulder  Radiation: none  Quality: dull, aching  Intensity/Severity: mild   Duration: since 09/20/2021  Progression of symptoms: no worsening, reports improvement  Onset quality: sudden  Timing: intermittent  Aggravating Factors: reaching backward  Alleviating Factors: NSAIDs, rest, ice, stretching/PT/OT  Previous Episodes: yes  Associated Symptoms: pain  ADLs Affected: work related activities, recreational activities/sports  Previous Treatment: prior surgery       Objective   Vital Signs:   Ht 188 cm (74\")   Wt 129 kg (285 lb)   BMI 36.59 kg/m²     Physical Exam  Physical Exam  Vitals signs and nursing note reviewed.   Constitutional:       Appearance: Normal appearance.   Pulmonary:      Effort: Pulmonary effort is normal.   Skin:     General: Skin is warm and dry.      Capillary Refill: Capillary refill takes less than 2 seconds.   Neurological:      General: No focal deficit present.      Mental Status: He is alert and oriented to person, place, and time. Mental status is at baseline.   Psychiatric:         " Mood and Affect: Mood normal.         Behavior: Behavior normal.         Thought Content: Thought content normal.         Judgment: Judgment normal.     Ortho Exam   Right shoulder. The patient is status-post rotator cuff repair 6 month(s) . Incision is clean and healing well. Arthroscopic portals are clean. Appropriate amounts of swelling and bruising are noted. The patients pain is well controlled. The passive range of motion is abduction 0-120 degrees, flexion 0-120 degrees. Axillary nerve function is well preserved. Radial artery pulses are palpable.  There is no instability of the shoulder.  Sulcus sign is negative.  Neurovascular status is intact.        Result Review :   The following data was reviewed by: Dandre Titus MD on 06/16/2022:  Radiologic studies - see below for interpretation  no diagnostic testing performed this visit            Procedures           Assessment   Assessment and Plan    Diagnoses and all orders for this visit:    1. S/P rotator cuff surgery (Primary)    2. S/P arthroscopy of right shoulder          Follow Up   · Continue with aggressive range of motion of the shoulder in flexion and abduction.  · Calcium and vitamin D for bone health.  · Extensive discussion with the patient regarding his work situation and how to avoid repetitive loading and lifting of the shoulder to prevent 3 trauma to the rotator cuff discussed with the patient.  · On good progression to cuff tear arthropathy also discussed with the patient but at this point he does not demonstrate any signs of the glenohumeral joint involvement with arthritis.  · Rest, ice, compression, and elevation (RICE) therapy  · Stretching and strengthening exercises of the flexors and abductors of the shoulder.  · OTC Alternate Ibuprofen and Tylenol as needed  · Follow up as needed  • Patient was given instructions and counseling regarding his condition or for health maintenance advice. Please see specific information pulled into the  AVS if appropriate.     Dandre Titus MD   Date of Encounter: 6/16/2022        EMR Dragon/Transcription disclaimer:  Much of this encounter note is an electronic transcription/translation of spoken language to printed text. The electronic translation of spoken language may permit erroneous, or at times, nonsensical words or phrases to be inadvertently transcribed; Although I have reviewed the note for such errors, some may still exist.

## 2022-07-22 ENCOUNTER — TELEPHONE (OUTPATIENT)
Dept: FAMILY MEDICINE CLINIC | Facility: CLINIC | Age: 57
End: 2022-07-22

## 2022-07-22 DIAGNOSIS — F41.1 GAD (GENERALIZED ANXIETY DISORDER): ICD-10-CM

## 2022-07-22 RX ORDER — SERTRALINE HYDROCHLORIDE 25 MG/1
25 TABLET, FILM COATED ORAL DAILY
Qty: 90 TABLET | Refills: 1 | Status: SHIPPED | OUTPATIENT
Start: 2022-07-22 | End: 2022-08-03 | Stop reason: ALTCHOICE

## 2022-08-03 ENCOUNTER — OFFICE VISIT (OUTPATIENT)
Dept: FAMILY MEDICINE CLINIC | Facility: CLINIC | Age: 57
End: 2022-08-03

## 2022-08-03 VITALS
BODY MASS INDEX: 37.73 KG/M2 | HEART RATE: 108 BPM | HEIGHT: 74 IN | WEIGHT: 294 LBS | DIASTOLIC BLOOD PRESSURE: 68 MMHG | SYSTOLIC BLOOD PRESSURE: 142 MMHG | OXYGEN SATURATION: 92 %

## 2022-08-03 DIAGNOSIS — F41.1 GAD (GENERALIZED ANXIETY DISORDER): Primary | ICD-10-CM

## 2022-08-03 PROCEDURE — 99214 OFFICE O/P EST MOD 30 MIN: CPT | Performed by: NURSE PRACTITIONER

## 2022-08-03 RX ORDER — VENLAFAXINE HYDROCHLORIDE 75 MG/1
75 CAPSULE, EXTENDED RELEASE ORAL DAILY
Qty: 30 CAPSULE | Refills: 5 | Status: SHIPPED | OUTPATIENT
Start: 2022-08-03 | End: 2022-09-13 | Stop reason: DRUGHIGH

## 2022-08-03 NOTE — PROGRESS NOTES
"Chief Complaint  Anxiety    Subjective        RL Alexandra presents to [facility]  Anxiety  Presents for follow-up visit. Symptoms include decreased concentration, depressed mood, excessive worry, insomnia, irritability, nervous/anxious behavior and restlessness. Patient reports no chest pain or shortness of breath. Symptoms occur most days. The quality of sleep is fair.         I have reviewed the patient's medical history in detail and updated the computerized patient record.    Current Outpatient Medications:   •  amitriptyline (ELAVIL) 25 MG tablet, Take 1 tablet by mouth Every Night., Disp: 90 tablet, Rfl: 1  •  aspirin  MG tablet, Take 1 tablet by mouth Daily., Disp: 30 tablet, Rfl: 0  •  lisinopril (PRINIVIL,ZESTRIL) 10 MG tablet, Take 1 tablet by mouth Daily., Disp: 30 tablet, Rfl: 11  •  venlafaxine XR (Effexor XR) 75 MG 24 hr capsule, Take 1 capsule by mouth Daily., Disp: 30 capsule, Rfl: 5     Objective   Vital Signs:  /68 (BP Location: Left arm, Patient Position: Sitting, Cuff Size: Adult)   Pulse 108   Ht 188 cm (74\")   Wt 133 kg (294 lb)   SpO2 92%   BMI 37.75 kg/m²   Estimated body mass index is 37.75 kg/m² as calculated from the following:    Height as of this encounter: 188 cm (74\").    Weight as of this encounter: 133 kg (294 lb).          Physical Exam  Constitutional:       Appearance: Normal appearance. He is obese.   Cardiovascular:      Rate and Rhythm: Normal rate and regular rhythm.      Pulses: Normal pulses.      Heart sounds: Normal heart sounds.   Neurological:      Mental Status: He is alert and oriented to person, place, and time.   Psychiatric:         Attention and Perception: Attention and perception normal.         Mood and Affect: Mood is depressed. Affect is flat.         Speech: Speech normal.         Behavior: Behavior normal. Behavior is cooperative.         Thought Content: Thought content normal.         Cognition and Memory: " Cognition and memory normal.         Judgment: Judgment normal.        Result Review :                Assessment and Plan   Diagnoses and all orders for this visit:    1. ROGERS (generalized anxiety disorder) (Primary)  -     venlafaxine XR (Effexor XR) 75 MG 24 hr capsule; Take 1 capsule by mouth Daily.  Dispense: 30 capsule; Refill: 5    Mr. Morris appears depressed and anxious.  He has been without the Zoloft for the past 9 days and just resumed the medication yesterday. He has been taking 50 mg daily which has not helped with his anxiety and depression.  He is to stop the Zoloft and start Venlafaxine XR 75 mg daily.  He is to follow up in 6 weeks on the medication changes.          Follow Up   Return in about 6 weeks (around 9/14/2022) for f/u on anxoety medication change.  Patient was given instructions and counseling regarding his condition or for health maintenance advice. Please see specific information pulled into the AVS if appropriate.

## 2022-09-13 ENCOUNTER — OFFICE VISIT (OUTPATIENT)
Dept: FAMILY MEDICINE CLINIC | Facility: CLINIC | Age: 57
End: 2022-09-13

## 2022-09-13 VITALS
SYSTOLIC BLOOD PRESSURE: 152 MMHG | OXYGEN SATURATION: 95 % | WEIGHT: 297 LBS | HEART RATE: 62 BPM | DIASTOLIC BLOOD PRESSURE: 66 MMHG | HEIGHT: 74 IN | BODY MASS INDEX: 38.12 KG/M2

## 2022-09-13 DIAGNOSIS — F41.1 GAD (GENERALIZED ANXIETY DISORDER): Primary | ICD-10-CM

## 2022-09-13 DIAGNOSIS — E78.2 MIXED HYPERLIPIDEMIA: ICD-10-CM

## 2022-09-13 DIAGNOSIS — R73.01 IFG (IMPAIRED FASTING GLUCOSE): ICD-10-CM

## 2022-09-13 PROCEDURE — 99214 OFFICE O/P EST MOD 30 MIN: CPT | Performed by: NURSE PRACTITIONER

## 2022-09-13 RX ORDER — VENLAFAXINE HYDROCHLORIDE 150 MG/1
150 CAPSULE, EXTENDED RELEASE ORAL DAILY
Qty: 90 CAPSULE | Refills: 1 | Status: SHIPPED | OUTPATIENT
Start: 2022-09-13 | End: 2023-02-21

## 2022-09-13 NOTE — PROGRESS NOTES
"Chief Complaint  Anxiety    Subjective        RL Alexandra presents to [facility]  F/u on starting Effexor. Feels that it is help but also feels that he may need a dose increase.     I have reviewed the patient's medical history in detail and updated the computerized patient record.    Current Outpatient Medications:   •  amitriptyline (ELAVIL) 25 MG tablet, Take 1 tablet by mouth Every Night., Disp: 90 tablet, Rfl: 1  •  aspirin  MG tablet, Take 1 tablet by mouth Daily., Disp: 30 tablet, Rfl: 0  •  lisinopril (PRINIVIL,ZESTRIL) 10 MG tablet, Take 1 tablet by mouth Daily., Disp: 30 tablet, Rfl: 11  •  venlafaxine XR (Effexor XR) 150 MG 24 hr capsule, Take 1 capsule by mouth Daily., Disp: 90 capsule, Rfl: 1     Objective   Vital Signs:  /66 (BP Location: Left arm, Patient Position: Sitting, Cuff Size: Adult)   Pulse 62   Ht 188 cm (74\")   Wt 135 kg (297 lb)   SpO2 95%   BMI 38.13 kg/m²   Estimated body mass index is 38.13 kg/m² as calculated from the following:    Height as of this encounter: 188 cm (74\").    Weight as of this encounter: 135 kg (297 lb).          Physical Exam  Vitals reviewed.   Constitutional:       Appearance: Normal appearance.   Neurological:      Mental Status: He is alert and oriented to person, place, and time.   Psychiatric:         Mood and Affect: Mood normal.         Behavior: Behavior normal.         Thought Content: Thought content normal.         Judgment: Judgment normal.        Result Review :                Assessment and Plan   Diagnoses and all orders for this visit:    1. ROGERS (generalized anxiety disorder) (Primary)  -     venlafaxine XR (Effexor XR) 150 MG 24 hr capsule; Take 1 capsule by mouth Daily.  Dispense: 90 capsule; Refill: 1    Mr. Morris appears to be doing well today.  We discussed his anxiety which is improving on the Effexor XR 75 mg. He is to increase the Effexor XR to 150 mg daily.  He is to follow up as needed for " now.          Follow Up   Return if symptoms worsen or fail to improve, for Next scheduled follow up.  Patient was given instructions and counseling regarding his condition or for health maintenance advice. Please see specific information pulled into the AVS if appropriate.     Patient has been erroneously marked as diabetic. Based on the available clinical information, he does not have diabetes and should therefore be excluded from diabetic health maintenance and quality measures for the remainder of the reporting period.

## 2022-12-01 ENCOUNTER — TELEMEDICINE (OUTPATIENT)
Dept: FAMILY MEDICINE CLINIC | Facility: TELEHEALTH | Age: 57
End: 2022-12-01

## 2022-12-01 DIAGNOSIS — J11.1 INFLUENZA: ICD-10-CM

## 2022-12-01 DIAGNOSIS — J22 LOWER RESPIRATORY INFECTION (E.G., BRONCHITIS, PNEUMONIA, PNEUMONITIS, PULMONITIS): Primary | ICD-10-CM

## 2022-12-01 DIAGNOSIS — J01.90 ACUTE NON-RECURRENT SINUSITIS, UNSPECIFIED LOCATION: ICD-10-CM

## 2022-12-01 PROCEDURE — 99213 OFFICE O/P EST LOW 20 MIN: CPT | Performed by: NURSE PRACTITIONER

## 2022-12-01 RX ORDER — AZITHROMYCIN 250 MG/1
TABLET, FILM COATED ORAL
Qty: 6 TABLET | Refills: 0 | Status: SHIPPED | OUTPATIENT
Start: 2022-12-01

## 2022-12-01 RX ORDER — PREDNISONE 10 MG/1
TABLET ORAL DAILY
Qty: 21 EACH | Refills: 0 | Status: SHIPPED | OUTPATIENT
Start: 2022-12-01 | End: 2022-12-07

## 2022-12-01 NOTE — PATIENT INSTRUCTIONS
Continue over-the-counter medicine as needed.  Tylenol for fever and body aches, increase fluids and rest.    If symptoms worsen or do not improve follow up with your PCP or visit your nearest Urgent Care Center or ER.

## 2022-12-01 NOTE — PROGRESS NOTES
Subjective   Chief Complaint   Patient presents with   • URI     Flu         YAN Morris is a 57 y.o. male.     History of Present Illness  Pt reports have the flu for more than 1 week. He states his fever resolved a few days ago and he started to feel better then symptoms worsening again within the last day or so. Low grade fever has returned, cough is burning his chest and is productive with light green sputum. He also has a sinus HA, congestion and a metallic taste in his mouth.   URI   This is a new problem. Episode onset: over 1 week. The problem has been unchanged. There has been no fever. Associated symptoms include congestion, coughing, headaches and wheezing. Pertinent negatives include no abdominal pain, chest pain, dysuria, ear pain, nausea, plugged ear sensation, sore throat or vomiting. Treatments tried: theralfu. The treatment provided no relief.        No Known Allergies    Past Medical History:   Diagnosis Date   • Acromioclavicular separation left, 1984   • Anxiety not diagnosed, just feel that way now    2021   • Arthritis    • Arthritis of left acromioclavicular joint    • Arthritis of right acromioclavicular joint    • Asthma apnea   • Cataract    • DDD (degenerative disc disease), cervical    • Deviated septum    • Dislocation, shoulder left, 1984   • Frozen shoulder 3 months ago sporadic   • History of high cholesterol    • Hyperlipidemia    • Hypertension    • Low back pain    • Neck pain    • Neuromuscular disorder (HCC)    • Sleep apnea     CPAP   • Torn rotator cuff     RIGHT       Past Surgical History:   Procedure Laterality Date   • BICEPS TENDON REPAIR Right     2007 approx   • COLONOSCOPY N/A 4/26/2018    Procedure: COLONOSCOPY into left colon with biopsies;  Surgeon: Robles Lopez MD;  Location: [facility] ENDOSCOPY;  Service: Gastroenterology   • COLONOSCOPY N/A 5/24/2018    Procedure: COLONOSCOPY into cecum with saline lift, hot snare polypectomy, clip;  Surgeon: Robles Gan  MD John;  Location: [facility] ENDOSCOPY;  Service: Gastroenterology   • SHOULDER ARTHROSCOPY W/ ROTATOR CUFF REPAIR Right 7/16/2021    Procedure: SHOULDER ARTHROSCOPY WITH ROTATOR CUFF REPAIR, labral tear debridement and edgard;  Surgeon: Dandre Titus MD;  Location: [facility] OR [facility] [place];  Service: Orthopedics;  Laterality: Right;   • SHOULDER ARTHROSCOPY W/ ROTATOR CUFF REPAIR Right 12/17/2021    Procedure: Right shoulder arthroscopy with mini open rotator cuff repair with possible patch augmentation;  Surgeon: Dandre Titus MD;  Location: [facility] OR [facility] [place];  Service: Orthopedics;  Laterality: Right;       Social History     Socioeconomic History   • Marital status:    Tobacco Use   • Smoking status: Every Day     Packs/day: 1.00     Years: 25.00     Pack years: 25.00     Types: Cigarettes     Start date: 6/1/1985   • Smokeless tobacco: Never   • Tobacco comments:     quit a bunch of times from a week to several months, never seem to be able to stay with it   Vaping Use   • Vaping Use: Never used   Substance and Sexual Activity   • Alcohol use: Yes     Comment: really if its averaged out its two a month   • Drug use: No   • Sexual activity: Yes     Partners: Female     Birth control/protection: None       Family History   Problem Relation Age of Onset   • Lung cancer Father    • Kidney disease Father    • Cancer Father    • Kidney disease Brother    • Cancer Paternal Aunt    • Osteoporosis Maternal Grandmother    • Malig Hyperthermia Neg Hx          Current Outpatient Medications:   •  amitriptyline (ELAVIL) 25 MG tablet, Take 1 tablet by mouth Every Night., Disp: 90 tablet, Rfl: 1  •  aspirin  MG tablet, Take 1 tablet by mouth Daily., Disp: 30 tablet, Rfl: 0  •  azithromycin (Zithromax Z-Claude) 250 MG tablet, Take 2 tablets by mouth on day 1, then 1 tablet daily on days 2-5, Disp: 6 tablet, Rfl: 0  •  lisinopril (PRINIVIL,ZESTRIL) 10 MG tablet, Take 1 tablet by mouth Daily., Disp: 30 tablet, Rfl: 11  •   predniSONE (DELTASONE) 10 MG (21) dose pack, Take  by mouth Daily for 6 days., Disp: 21 each, Rfl: 0  •  venlafaxine XR (Effexor XR) 150 MG 24 hr capsule, Take 1 capsule by mouth Daily., Disp: 90 capsule, Rfl: 1      Review of Systems   Constitutional: Positive for fatigue and fever.   HENT: Positive for congestion and sinus pressure. Negative for ear pain and sore throat.    Respiratory: Positive for cough, shortness of breath and wheezing. Negative for chest tightness.    Cardiovascular: Negative for chest pain and palpitations.   Gastrointestinal: Negative for abdominal distention, abdominal pain, nausea and vomiting.   Genitourinary: Negative for dysuria.   Musculoskeletal: Negative for myalgias.   Neurological: Positive for headache.        There were no vitals filed for this visit.    Objective   Physical Exam  Constitutional:       General: He is not in acute distress.     Appearance: Normal appearance. He is not ill-appearing, toxic-appearing or diaphoretic.   HENT:      Head: Normocephalic.      Nose: Congestion present.      Right Sinus: Maxillary sinus tenderness present.      Left Sinus: Maxillary sinus tenderness present.      Comments: Per pt     Mouth/Throat:      Lips: Pink.      Mouth: Mucous membranes are moist.   Pulmonary:      Effort: Pulmonary effort is normal.   Neurological:      Mental Status: He is alert and oriented to person, place, and time.   Psychiatric:         Mood and Affect: Mood normal.         Behavior: Behavior normal.          Procedures     Assessment & Plan   Diagnoses and all orders for this visit:    1. Lower respiratory infection (e.g., bronchitis, pneumonia, pneumonitis, pulmonitis) (Primary)  -     predniSONE (DELTASONE) 10 MG (21) dose pack; Take  by mouth Daily for 6 days.  Dispense: 21 each; Refill: 0  -     azithromycin (Zithromax Z-Claude) 250 MG tablet; Take 2 tablets by mouth on day 1, then 1 tablet daily on days 2-5  Dispense: 6 tablet; Refill: 0    2.  Influenza    3. Acute non-recurrent sinusitis, unspecified location  -     predniSONE (DELTASONE) 10 MG (21) dose pack; Take  by mouth Daily for 6 days.  Dispense: 21 each; Refill: 0  -     azithromycin (Zithromax Z-Claude) 250 MG tablet; Take 2 tablets by mouth on day 1, then 1 tablet daily on days 2-5  Dispense: 6 tablet; Refill: 0    Continue over-the-counter medicine as needed.  Tylenol for fever and body aches, increase fluids and rest.    If symptoms worsen or do not improve follow up with your PCP or visit your nearest Urgent Care Center or ER.        PLAN: Discussed dosing, side effects, recommended other symptomatic care.  Patient should follow up with primary care provider, Urgent Care or ER if symptoms worsen, fail to resolve or other symptoms need attention. Patient/family agree to the above.         BASIL Arellano     The use of a video visit has been reviewed with the patient and verbal informed consent has been obtained. Myself and YAN Morris participated in this visit. The patient is located at [address] [place] [address]. I am located in [place], [place]. Mychart and Zoom were utilized.        This visit was performed via Telehealth.  This patient has been instructed to follow-up with their primary care provider if their symptoms worsen or the treatment provided does not resolve their illness.

## 2023-02-21 DIAGNOSIS — F41.1 GAD (GENERALIZED ANXIETY DISORDER): ICD-10-CM

## 2023-02-21 DIAGNOSIS — I10 ESSENTIAL HYPERTENSION: ICD-10-CM

## 2023-02-21 RX ORDER — LISINOPRIL 10 MG/1
TABLET ORAL
Qty: 90 TABLET | Refills: 3 | Status: SHIPPED | OUTPATIENT
Start: 2023-02-21

## 2023-02-21 RX ORDER — VENLAFAXINE HYDROCHLORIDE 150 MG/1
CAPSULE, EXTENDED RELEASE ORAL
Qty: 30 CAPSULE | Refills: 5 | Status: SHIPPED | OUTPATIENT
Start: 2023-02-21

## 2023-09-01 DIAGNOSIS — F41.1 GAD (GENERALIZED ANXIETY DISORDER): ICD-10-CM

## 2023-09-01 RX ORDER — VENLAFAXINE HYDROCHLORIDE 150 MG/1
CAPSULE, EXTENDED RELEASE ORAL
Qty: 30 CAPSULE | Refills: 5 | Status: SHIPPED | OUTPATIENT
Start: 2023-09-01

## 2023-10-11 DIAGNOSIS — F41.1 GAD (GENERALIZED ANXIETY DISORDER): ICD-10-CM

## 2023-10-11 RX ORDER — VENLAFAXINE HYDROCHLORIDE 150 MG/1
150 CAPSULE, EXTENDED RELEASE ORAL DAILY
Qty: 90 CAPSULE | Refills: 0 | Status: SHIPPED | OUTPATIENT
Start: 2023-10-11

## 2024-01-30 DIAGNOSIS — F41.1 GAD (GENERALIZED ANXIETY DISORDER): ICD-10-CM

## 2024-01-30 RX ORDER — VENLAFAXINE HYDROCHLORIDE 150 MG/1
150 CAPSULE, EXTENDED RELEASE ORAL DAILY
Qty: 90 CAPSULE | Refills: 0 | OUTPATIENT
Start: 2024-01-30

## 2024-02-13 DIAGNOSIS — F41.1 GAD (GENERALIZED ANXIETY DISORDER): ICD-10-CM

## 2024-02-13 RX ORDER — VENLAFAXINE HYDROCHLORIDE 150 MG/1
150 CAPSULE, EXTENDED RELEASE ORAL DAILY
Qty: 90 CAPSULE | Refills: 0 | Status: SHIPPED | OUTPATIENT
Start: 2024-02-13

## 2024-02-20 DIAGNOSIS — R73.01 IFG (IMPAIRED FASTING GLUCOSE): Primary | ICD-10-CM

## 2024-02-20 DIAGNOSIS — E78.2 MIXED HYPERLIPIDEMIA: ICD-10-CM

## 2024-02-21 LAB
ALBUMIN SERPL-MCNC: 4.7 G/DL (ref 3.8–4.9)
ALBUMIN/GLOB SERPL: 1.8 {RATIO} (ref 1.2–2.2)
ALP SERPL-CCNC: 61 IU/L (ref 44–121)
ALT SERPL-CCNC: 87 IU/L (ref 0–44)
AST SERPL-CCNC: 42 IU/L (ref 0–40)
BILIRUB SERPL-MCNC: 0.7 MG/DL (ref 0–1.2)
BUN SERPL-MCNC: 13 MG/DL (ref 6–24)
BUN/CREAT SERPL: 15 (ref 9–20)
CALCIUM SERPL-MCNC: 9.8 MG/DL (ref 8.7–10.2)
CHLORIDE SERPL-SCNC: 101 MMOL/L (ref 96–106)
CHOLEST SERPL-MCNC: 253 MG/DL (ref 100–199)
CHOLEST/HDLC SERPL: 8.7 RATIO (ref 0–5)
CO2 SERPL-SCNC: 23 MMOL/L (ref 20–29)
CREAT SERPL-MCNC: 0.87 MG/DL (ref 0.76–1.27)
EGFRCR SERPLBLD CKD-EPI 2021: 101 ML/MIN/1.73
GLOBULIN SER CALC-MCNC: 2.6 G/DL (ref 1.5–4.5)
GLUCOSE SERPL-MCNC: 128 MG/DL (ref 70–99)
HBA1C MFR BLD: 5.9 % (ref 4.8–5.6)
HDLC SERPL-MCNC: 29 MG/DL
LDLC SERPL CALC-MCNC: 183 MG/DL (ref 0–99)
POTASSIUM SERPL-SCNC: 4.7 MMOL/L (ref 3.5–5.2)
PROT SERPL-MCNC: 7.3 G/DL (ref 6–8.5)
SODIUM SERPL-SCNC: 138 MMOL/L (ref 134–144)
TRIGL SERPL-MCNC: 213 MG/DL (ref 0–149)
VLDLC SERPL CALC-MCNC: 41 MG/DL (ref 5–40)

## 2024-02-26 ENCOUNTER — OFFICE VISIT (OUTPATIENT)
Dept: FAMILY MEDICINE CLINIC | Facility: CLINIC | Age: 59
End: 2024-02-26
Payer: COMMERCIAL

## 2024-02-26 VITALS
DIASTOLIC BLOOD PRESSURE: 78 MMHG | SYSTOLIC BLOOD PRESSURE: 122 MMHG | HEART RATE: 96 BPM | WEIGHT: 312.8 LBS | OXYGEN SATURATION: 93 % | BODY MASS INDEX: 40.14 KG/M2 | HEIGHT: 74 IN

## 2024-02-26 DIAGNOSIS — E78.2 MIXED HYPERLIPIDEMIA: ICD-10-CM

## 2024-02-26 DIAGNOSIS — Z00.00 ANNUAL PHYSICAL EXAM: Primary | ICD-10-CM

## 2024-02-26 DIAGNOSIS — R73.01 IFG (IMPAIRED FASTING GLUCOSE): ICD-10-CM

## 2024-02-26 DIAGNOSIS — R74.8 ELEVATED LIVER ENZYMES: ICD-10-CM

## 2024-02-26 PROCEDURE — 99396 PREV VISIT EST AGE 40-64: CPT | Performed by: NURSE PRACTITIONER

## 2024-02-26 NOTE — PROGRESS NOTES
"Chief Complaint  Annual Exam    Subjective        RL Alexandra presents to [facility]  History of Present Illness  Mr. Morris presents today for an annual physical exam with lab review.       I have reviewed the patient's medical history in detail and updated the computerized patient record.       Current Outpatient Medications:     aspirin  MG tablet, Take 1 tablet by mouth Daily., Disp: 30 tablet, Rfl: 0    lisinopril (PRINIVIL,ZESTRIL) 10 MG tablet, TAKE 1 TABLET BY MOUTH EVERY DAY, Disp: 90 tablet, Rfl: 3    multivitamin with minerals (MULTIVITAMIN ADULTS PO), Take 1 tablet by mouth Daily. Gummy, patient avoids vitamins with Iron., Disp: , Rfl:     venlafaxine XR (EFFEXOR-XR) 150 MG 24 hr capsule, TAKE 1 CAPSULE BY MOUTH DAILY, Disp: 90 capsule, Rfl: 0     Review of Systems   Constitutional: Negative.    HENT: Negative.     Eyes: Negative.    Respiratory: Negative.     Cardiovascular: Negative.    Gastrointestinal: Negative.    Genitourinary: Negative.    Musculoskeletal: Negative.    Skin: Negative.    Neurological: Negative.    Psychiatric/Behavioral: Negative.        Objective   Vital Signs:  /78 (BP Location: Left arm, Patient Position: Sitting, Cuff Size: Adult)   Pulse 96   Ht 188 cm (74.02\")   Wt (!) 142 kg (312 lb 12.8 oz)   SpO2 93%   BMI 40.14 kg/m²   Estimated body mass index is 40.14 kg/m² as calculated from the following:    Height as of this encounter: 188 cm (74.02\").    Weight as of this encounter: 142 kg (312 lb 12.8 oz).             Physical Exam  Vitals reviewed.   Constitutional:       Appearance: Normal appearance. He is obese.   HENT:      Right Ear: Tympanic membrane normal.      Left Ear: Tympanic membrane normal.      Ears:      Comments: Left TM has small perforation present  Cardiovascular:      Rate and Rhythm: Normal rate and regular rhythm.      Pulses: Normal pulses.      Heart sounds: Normal heart sounds.   Pulmonary:      Effort: " Pulmonary effort is normal.      Breath sounds: Normal breath sounds.   Abdominal:      General: Bowel sounds are normal.      Palpations: Abdomen is soft.   Skin:     General: Skin is warm and dry.   Neurological:      Mental Status: He is alert and oriented to person, place, and time.   Psychiatric:      Comments: No acute distress        Result Review :      Common labs          2/20/2024    08:56   Common Labs   Glucose 128    BUN 13    Creatinine 0.87    Sodium 138    Potassium 4.7    Chloride 101    Calcium 9.8    Total Protein 7.3    Albumin 4.7    Total Bilirubin 0.7    Alkaline Phosphatase 61    AST (SGOT) 42    ALT (SGPT) 87    Total Cholesterol 253    Triglycerides 213    HDL Cholesterol 29    LDL Cholesterol  183    Hemoglobin A1C 5.9                   Assessment and Plan     Diagnoses and all orders for this visit:    1. Annual physical exam (Primary)    2. IFG (impaired fasting glucose)  -     Comprehensive Metabolic Panel; Future    3. Mixed hyperlipidemia  -     Lipid Panel With / Chol / HDL Ratio; Future    4. Elevated liver enzymes  -     Comprehensive Metabolic Panel; Future    Mr. Morris appears to be doing well today.  His physical exam is unremarkable.  I have reviewed his labs with him today. His glucose, liver enzymes and lipid levels are not at goal. He is to use diet and exercise to control his lipids, weight and glucose levels.   We have discussed age related healthy behaviors.            Follow Up     Return in about 6 months (around 8/26/2024), or if symptoms worsen or fail to improve, for Fasting labs 1 week prior to next f/u on lipid levels .  Patient was given instructions and counseling regarding his condition or for health maintenance advice. Please see specific information pulled into the AVS if appropriate.     Patient has been erroneously marked as diabetic. Based on the available clinical information, he does not have diabetes and should therefore be excluded from diabetic  health maintenance and quality measures for the remainder of the reporting period.

## 2024-02-29 DIAGNOSIS — I10 ESSENTIAL HYPERTENSION: ICD-10-CM

## 2024-02-29 RX ORDER — LISINOPRIL 10 MG/1
TABLET ORAL
Qty: 90 TABLET | Refills: 3 | Status: SHIPPED | OUTPATIENT
Start: 2024-02-29

## 2024-08-16 DIAGNOSIS — I10 ESSENTIAL HYPERTENSION: ICD-10-CM

## 2024-08-16 DIAGNOSIS — F41.1 GAD (GENERALIZED ANXIETY DISORDER): ICD-10-CM

## 2024-08-16 RX ORDER — LISINOPRIL 10 MG/1
10 TABLET ORAL DAILY
Qty: 90 TABLET | Refills: 3 | Status: SHIPPED | OUTPATIENT
Start: 2024-08-16

## 2024-08-16 RX ORDER — VENLAFAXINE HYDROCHLORIDE 150 MG/1
150 CAPSULE, EXTENDED RELEASE ORAL DAILY
Qty: 90 CAPSULE | Refills: 0 | Status: SHIPPED | OUTPATIENT
Start: 2024-08-16

## 2024-09-30 ENCOUNTER — OFFICE VISIT (OUTPATIENT)
Dept: FAMILY MEDICINE CLINIC | Facility: CLINIC | Age: 59
End: 2024-09-30
Payer: COMMERCIAL

## 2024-09-30 VITALS
OXYGEN SATURATION: 95 % | WEIGHT: 313.1 LBS | SYSTOLIC BLOOD PRESSURE: 134 MMHG | BODY MASS INDEX: 40.18 KG/M2 | HEIGHT: 74 IN | HEART RATE: 92 BPM | DIASTOLIC BLOOD PRESSURE: 80 MMHG

## 2024-09-30 DIAGNOSIS — E78.2 MIXED HYPERLIPIDEMIA: Primary | ICD-10-CM

## 2024-09-30 DIAGNOSIS — F41.1 GAD (GENERALIZED ANXIETY DISORDER): ICD-10-CM

## 2024-09-30 PROCEDURE — 99214 OFFICE O/P EST MOD 30 MIN: CPT | Performed by: NURSE PRACTITIONER

## 2024-09-30 RX ORDER — DULOXETIN HYDROCHLORIDE 60 MG/1
60 CAPSULE, DELAYED RELEASE ORAL DAILY
Qty: 90 CAPSULE | Refills: 1 | Status: SHIPPED | OUTPATIENT
Start: 2024-09-30

## 2024-09-30 RX ORDER — VENLAFAXINE 50 MG/1
TABLET ORAL
Qty: 28 TABLET | Refills: 0 | Status: SHIPPED | OUTPATIENT
Start: 2024-09-30 | End: 2024-10-28

## 2024-09-30 NOTE — PROGRESS NOTES
"Chief Complaint  Hyperlipidemia (F/U on Lipids)    Subjective        RL Alexandra presents to [facility]  Hyperlipidemia  This is a chronic problem. The current episode started more than 1 month ago. Recent lipid tests were reviewed and are variable. Exacerbating diseases include obesity. Pertinent negatives include no chest pain, leg pain, myalgias or shortness of breath. Current antihyperlipidemic treatment includes diet change and exercise. There are no compliance problems.  Risk factors for coronary artery disease include male sex, obesity, dyslipidemia and hypertension.       I have reviewed the patient's medical history in detail and updated the computerized patient record.       Current Outpatient Medications:     aspirin  MG tablet, Take 1 tablet by mouth Daily., Disp: 30 tablet, Rfl: 0    lisinopril (PRINIVIL,ZESTRIL) 10 MG tablet, Take 1 tablet by mouth Daily., Disp: 90 tablet, Rfl: 3    multivitamin with minerals (MULTIVITAMIN ADULTS PO), Take 1 tablet by mouth Daily. Gummy, patient avoids vitamins with Iron., Disp: , Rfl:     DULoxetine (Cymbalta) 60 MG capsule, Take 1 capsule by mouth Daily., Disp: 90 capsule, Rfl: 1    venlafaxine (EFFEXOR) 50 MG tablet, Take 2 tablets by mouth Daily for 7 days, THEN 1 tablet Daily for 7 days, THEN 0.5 tablets Daily for 7 days, THEN 0.5 tablets Every Other Day for 7 days., Disp: 28 tablet, Rfl: 0     Objective   Vital Signs:  /80 (BP Location: Left arm, Patient Position: Sitting, Cuff Size: Large Adult)   Pulse 92   Ht 188 cm (74.02\")   Wt (!) 142 kg (313 lb 1.6 oz)   SpO2 95%   BMI 40.18 kg/m²   Estimated body mass index is 40.18 kg/m² as calculated from the following:    Height as of this encounter: 188 cm (74.02\").    Weight as of this encounter: 142 kg (313 lb 1.6 oz).          Physical Exam  Vitals reviewed.   Constitutional:       Appearance: Normal appearance. He is morbidly obese.   Cardiovascular:      Rate and " Rhythm: Normal rate and regular rhythm.      Pulses: Normal pulses.      Heart sounds: Normal heart sounds.   Pulmonary:      Effort: Pulmonary effort is normal.      Breath sounds: Normal breath sounds.   Skin:     General: Skin is warm and dry.   Neurological:      Mental Status: He is alert and oriented to person, place, and time.   Psychiatric:      Comments: No acute distress        Result Review :    Lipid Panel          2/20/2024    08:56 9/16/2024    08:47   Lipid Panel   Total Cholesterol 253  232    Triglycerides 213  205    HDL Cholesterol 29  29    VLDL Cholesterol 41  38    LDL Cholesterol  183  165                Assessment and Plan   Diagnoses and all orders for this visit:    1. Mixed hyperlipidemia (Primary)    2. ROGERS (generalized anxiety disorder)  -     venlafaxine (EFFEXOR) 50 MG tablet; Take 2 tablets by mouth Daily for 7 days, THEN 1 tablet Daily for 7 days, THEN 0.5 tablets Daily for 7 days, THEN 0.5 tablets Every Other Day for 7 days.  Dispense: 28 tablet; Refill: 0  -     DULoxetine (Cymbalta) 60 MG capsule; Take 1 capsule by mouth Daily.  Dispense: 90 capsule; Refill: 1    Mr. Morris appears to be doing well today.  We have discussed his lipid levles and the slight improvement he has made since his last visit. He is to continue to use diet and exercise to manage his levels.  He also wants to come off of the Venlafaxine due to the sexual side effects. I have provided his with a schedule to wean off of the Venlafaxine and to start the duloxetine once he is taking the venlafaxine every other day.           Follow Up   Return in about 6 months (around 3/30/2025) for Annual physical.  Patient was given instructions and counseling regarding his condition or for health maintenance advice. Please see specific information pulled into the AVS if appropriate.

## 2025-03-10 DIAGNOSIS — I10 ESSENTIAL HYPERTENSION: ICD-10-CM

## 2025-03-10 RX ORDER — LISINOPRIL 10 MG/1
10 TABLET ORAL DAILY
Qty: 90 TABLET | Refills: 3 | Status: SHIPPED | OUTPATIENT
Start: 2025-03-10

## 2025-03-18 DIAGNOSIS — F41.1 GAD (GENERALIZED ANXIETY DISORDER): ICD-10-CM

## 2025-03-18 DIAGNOSIS — E78.2 MIXED HYPERLIPIDEMIA: Primary | ICD-10-CM

## 2025-03-18 RX ORDER — DULOXETIN HYDROCHLORIDE 60 MG/1
60 CAPSULE, DELAYED RELEASE ORAL DAILY
Qty: 90 CAPSULE | Refills: 1 | Status: SHIPPED | OUTPATIENT
Start: 2025-03-18

## 2025-03-25 LAB
ALBUMIN SERPL-MCNC: 4.6 G/DL (ref 3.8–4.9)
ALP SERPL-CCNC: 68 IU/L (ref 44–121)
ALT SERPL-CCNC: 84 IU/L (ref 0–44)
AST SERPL-CCNC: 45 IU/L (ref 0–40)
BILIRUB SERPL-MCNC: 0.6 MG/DL (ref 0–1.2)
BUN SERPL-MCNC: 12 MG/DL (ref 6–24)
BUN/CREAT SERPL: 13 (ref 9–20)
CALCIUM SERPL-MCNC: 9.3 MG/DL (ref 8.7–10.2)
CHLORIDE SERPL-SCNC: 102 MMOL/L (ref 96–106)
CHOLEST SERPL-MCNC: 202 MG/DL (ref 100–199)
CHOLEST/HDLC SERPL: 8.8 RATIO (ref 0–5)
CO2 SERPL-SCNC: 23 MMOL/L (ref 20–29)
CREAT SERPL-MCNC: 0.92 MG/DL (ref 0.76–1.27)
EGFRCR SERPLBLD CKD-EPI 2021: 97 ML/MIN/1.73
GLOBULIN SER CALC-MCNC: 2.5 G/DL (ref 1.5–4.5)
GLUCOSE SERPL-MCNC: 124 MG/DL (ref 70–99)
HBA1C MFR BLD: 6.3 % (ref 4.8–5.6)
HDLC SERPL-MCNC: 23 MG/DL
LDLC SERPL CALC-MCNC: 147 MG/DL (ref 0–99)
POTASSIUM SERPL-SCNC: 4.7 MMOL/L (ref 3.5–5.2)
PROT SERPL-MCNC: 7.1 G/DL (ref 6–8.5)
SODIUM SERPL-SCNC: 140 MMOL/L (ref 134–144)
TRIGL SERPL-MCNC: 173 MG/DL (ref 0–149)
VLDLC SERPL CALC-MCNC: 32 MG/DL (ref 5–40)

## 2025-04-07 ENCOUNTER — OFFICE VISIT (OUTPATIENT)
Dept: FAMILY MEDICINE CLINIC | Facility: CLINIC | Age: 60
End: 2025-04-07
Payer: COMMERCIAL

## 2025-04-07 ENCOUNTER — TELEPHONE (OUTPATIENT)
Dept: FAMILY MEDICINE CLINIC | Facility: CLINIC | Age: 60
End: 2025-04-07

## 2025-04-07 VITALS
HEART RATE: 80 BPM | SYSTOLIC BLOOD PRESSURE: 130 MMHG | HEIGHT: 74 IN | DIASTOLIC BLOOD PRESSURE: 84 MMHG | TEMPERATURE: 97.5 F | BODY MASS INDEX: 40.43 KG/M2 | OXYGEN SATURATION: 94 % | WEIGHT: 315 LBS

## 2025-04-07 DIAGNOSIS — Z12.2 ENCOUNTER FOR SCREENING FOR LUNG CANCER: ICD-10-CM

## 2025-04-07 DIAGNOSIS — Z12.11 SCREENING FOR COLON CANCER: ICD-10-CM

## 2025-04-07 DIAGNOSIS — Z00.00 ANNUAL PHYSICAL EXAM: Primary | ICD-10-CM

## 2025-04-07 DIAGNOSIS — E78.2 MIXED HYPERLIPIDEMIA: ICD-10-CM

## 2025-04-07 RX ORDER — ROSUVASTATIN CALCIUM 10 MG/1
10 TABLET, COATED ORAL DAILY
Qty: 90 TABLET | Refills: 1 | Status: SHIPPED | OUTPATIENT
Start: 2025-04-07 | End: 2025-04-07

## 2025-04-07 NOTE — PROGRESS NOTES
"Chief Complaint  Annual Exam (Annual physical)    Subjective        RL Alexandra presents to [facility] PRIMARY [facility]  History of Present Illness  Mr. Morris is here today for annual physical exam with lab review.        I have reviewed the patient's medical history in detail and updated the computerized patient record.       Current Outpatient Medications:     DULoxetine (CYMBALTA) 60 MG capsule, TAKE 1 CAPSULE BY MOUTH DAILY, Disp: 90 capsule, Rfl: 1    lisinopril (PRINIVIL,ZESTRIL) 10 MG tablet, TAKE 1 TABLET BY MOUTH EVERY DAY, Disp: 90 tablet, Rfl: 3    multivitamin with minerals (MULTIVITAMIN ADULTS PO), Take 1 tablet by mouth Daily. Gummy, patient avoids vitamins with Iron., Disp: , Rfl:     rosuvastatin (Crestor) 10 MG tablet, Take 1 tablet by mouth Daily., Disp: 90 tablet, Rfl: 1     Review of Systems   Constitutional: Negative.    HENT: Negative.     Eyes: Negative.    Respiratory: Negative.     Cardiovascular: Negative.    Gastrointestinal: Negative.    Genitourinary: Negative.    Musculoskeletal: Negative.    Skin: Negative.    Neurological: Negative.    Psychiatric/Behavioral: Negative.        Objective   Vital Signs:  /84 (BP Location: Left arm, Patient Position: Sitting, Cuff Size: Large Adult)   Pulse 80   Temp 97.5 °F (36.4 °C) (Temporal)   Ht 188 cm (74.02\")   Wt (!) 144 kg (316 lb 9.6 oz)   SpO2 94%   BMI 40.63 kg/m²   Estimated body mass index is 40.63 kg/m² as calculated from the following:    Height as of this encounter: 188 cm (74.02\").    Weight as of this encounter: 144 kg (316 lb 9.6 oz).          Physical Exam  Vitals reviewed.   Constitutional:       Appearance: Normal appearance. He is morbidly obese.   Neck:      Thyroid: No thyromegaly or thyroid tenderness.      Vascular: No carotid bruit or JVD.   Cardiovascular:      Rate and Rhythm: Normal rate and regular rhythm.      Pulses: Normal pulses.      Heart sounds: Normal heart sounds.   Pulmonary:      " Effort: Pulmonary effort is normal.      Breath sounds: Normal breath sounds.   Abdominal:      General: Bowel sounds are normal.      Palpations: Abdomen is soft.   Musculoskeletal:         General: Normal range of motion.      Cervical back: Normal range of motion and neck supple.   Skin:     General: Skin is warm and dry.   Neurological:      Mental Status: He is alert and oriented to person, place, and time.   Psychiatric:      Comments: No acute distress        Result Review :    Common labs          9/16/2024    08:47 3/24/2025    09:14   Common Labs   Glucose 127  124    BUN 14  12    Creatinine 0.97  0.92    Sodium 139  140    Potassium 5.0  4.7    Chloride 101  102    Calcium 9.6  9.3    Albumin 4.7  4.6    Total Bilirubin 0.5  0.6    Alkaline Phosphatase 60  68    AST (SGOT) 23  45    ALT (SGPT) 48  84    Total Cholesterol 232  202    Triglycerides 205  173    HDL Cholesterol 29  23    LDL Cholesterol  165  147    Hemoglobin A1C  6.3                Assessment and Plan   Diagnoses and all orders for this visit:    1. Annual physical exam (Primary)    2. Screening for colon cancer  -     Cologuard - Stool, Per Rectum; Future    3. Encounter for screening for lung cancer  -      CT Chest Low Dose Cancer Screening WO    4. Mixed hyperlipidemia  -     rosuvastatin (Crestor) 10 MG tablet; Take 1 tablet by mouth Daily.  Dispense: 90 tablet; Refill: 1    Mr. Martinez appears to be doing well today.  His physical exam is unremarkable. I have reviewed his labs with him today.   His A1C is increasing slowly and his lipids show some improvement. I will start him on Rosuvastatin 10 mg daily to assist with lowering his lipid levels.  We have discussed age related healthy behaviors.   I have ordered Cologuard for colon cancer screening and also a low dose CT scan for lung cancer screening. His is currently smoking tobacco.       Follow Up   Return in about 6 months (around 10/7/2025), or if symptoms worsen or fail to  improve, for Fasting labs 1 week prior to next f/ulipids and glucose.  Patient was given instructions and counseling regarding his condition or for health maintenance advice. Please see specific information pulled into the AVS if appropriate.

## 2025-04-25 ENCOUNTER — RESULTS FOLLOW-UP (OUTPATIENT)
Dept: FAMILY MEDICINE CLINIC | Facility: CLINIC | Age: 60
End: 2025-04-25
Payer: COMMERCIAL

## 2025-04-28 ENCOUNTER — TELEPHONE (OUTPATIENT)
Dept: FAMILY MEDICINE CLINIC | Facility: CLINIC | Age: 60
End: 2025-04-28

## 2025-04-28 DIAGNOSIS — R19.5 POSITIVE COLORECTAL CANCER SCREENING USING COLOGUARD TEST: Primary | ICD-10-CM

## 2025-04-28 NOTE — TELEPHONE ENCOUNTER
"  Caller: YAN Morris \"Onesimo\"    Relationship: Self    Best call back number: [phone]        What was the call regarding: PATIENT CALLING TO GET RESULTS FROM COLOGUARD TEST     PLEASE GIVE CALLBACK       "

## 2025-04-29 ENCOUNTER — TELEPHONE (OUTPATIENT)
Dept: GASTROENTEROLOGY | Facility: CLINIC | Age: 60
End: 2025-04-29
Payer: COMMERCIAL

## 2025-04-29 NOTE — TELEPHONE ENCOUNTER
Attempted to contact pt. Left voicemail    Pt is not established and can be scheduled for next available DA call unless pt has preference.

## 2025-04-30 ENCOUNTER — PREP FOR SURGERY (OUTPATIENT)
Dept: OTHER | Facility: HOSPITAL | Age: 60
End: 2025-04-30
Payer: COMMERCIAL

## 2025-04-30 ENCOUNTER — CLINICAL SUPPORT (OUTPATIENT)
Dept: GASTROENTEROLOGY | Facility: CLINIC | Age: 60
End: 2025-04-30
Payer: COMMERCIAL

## 2025-04-30 DIAGNOSIS — Z86.0100 HISTORY OF COLON POLYPS: ICD-10-CM

## 2025-04-30 DIAGNOSIS — R19.5 POSITIVE COLORECTAL CANCER SCREENING USING COLOGUARD TEST: Primary | ICD-10-CM

## 2025-04-30 RX ORDER — POLYETHYLENE GLYCOL 3350, SODIUM CHLORIDE, SODIUM BICARBONATE, POTASSIUM CHLORIDE 420; 11.2; 5.72; 1.48 G/4L; G/4L; G/4L; G/4L
POWDER, FOR SOLUTION ORAL
Qty: 4000 ML | Refills: 0 | Status: SHIPPED | OUTPATIENT
Start: 2025-04-30

## 2025-04-30 NOTE — PROGRESS NOTES
YAN Morris  1965  59 y.o.    Reason for call: Positive Cologuard  Prep prescribed: Nulytley  Prep instructions reviewed with patient and sent to patient via [employer]  Is the patient currently on any injectable or oral medications for weight loss or diabetes? No  Clearance needed? No  If yes, what clearance is needed? N/A  Clearance has been requested from N/A  The patient has been scheduled for: Colonoscopy    If scheduled for diagnostic colonoscopy YAN Esquedarex is aware they have been scheduled for a diagnostic colonoscopy, because the referral was sent over for positive cologuard.    After your procedure, you will be contacted with results. Please confirm the best phone # to reach the patient: [phone]  Family history of colon cancer? No  If yes, indicate relative: N/A  Tentative Procedure Date: 06/30/2025    Date/Place of last Scope: [facility]: 05/24/18  Able to obtain report? yes    Family History   Problem Relation Age of Onset    Lung cancer Father     Kidney disease Father     Cancer Father     Kidney disease Brother     Cancer Paternal Aunt     Osteoporosis Maternal Grandmother     Malig Hyperthermia Neg Hx     Colon cancer Neg Hx      Past Medical History:   Diagnosis Date    Acromioclavicular separation left, 1984    Anxiety not diagnosed, just feel that way now    2021    Arthritis     Arthritis of left acromioclavicular joint     Arthritis of right acromioclavicular joint     Asthma apnea    Cataract     DDD (degenerative disc disease), cervical     Deviated septum     Dislocation, shoulder left, 1984    Frozen shoulder 3 months ago sporadic    History of high cholesterol     Hyperlipidemia     Hypertension     Low back pain     Neck pain     Neuromuscular disorder     Sleep apnea     CPAP    Torn rotator cuff     RIGHT     No Known Allergies  Past Surgical History:   Procedure Laterality Date    BICEPS TENDON REPAIR Right     2007 approx    COLONOSCOPY N/A 4/26/2018    Procedure: COLONOSCOPY  into left colon with biopsies;  Surgeon: Robles Lopez MD;  Location: [facility] ENDOSCOPY;  Service: Gastroenterology    COLONOSCOPY N/A 5/24/2018    Procedure: COLONOSCOPY into cecum with saline lift, hot snare polypectomy, clip;  Surgeon: Robles Lopez MD;  Location: [facility] ENDOSCOPY;  Service: Gastroenterology    SHOULDER ARTHROSCOPY W/ ROTATOR CUFF REPAIR Right 7/16/2021    Procedure: SHOULDER ARTHROSCOPY WITH ROTATOR CUFF REPAIR, labral tear debridement and edgard;  Surgeon: Dandre Titus MD;  Location:  [facility];  Service: Orthopedics;  Laterality: Right;    SHOULDER ARTHROSCOPY W/ ROTATOR CUFF REPAIR Right 12/17/2021    Procedure: Right shoulder arthroscopy with mini open rotator cuff repair with possible patch augmentation;  Surgeon: Dandre Titus MD;  Location: [facility] OR [facility];  Service: Orthopedics;  Laterality: Right;     Social History     Socioeconomic History    Marital status:    Tobacco Use    Smoking status: Every Day     Current packs/day: 1.00     Average packs/day: 1 pack/day for 39.9 years (39.9 ttl pk-yrs)     Types: Cigarettes     Start date: 6/1/1985    Smokeless tobacco: Never    Tobacco comments:     quit a bunch of times from a week to several months, never seem to be able to stay with it   Vaping Use    Vaping status: Never Used   Substance and Sexual Activity    Alcohol use: Yes     Comment: really if its averaged out its two a month    Drug use: No    Sexual activity: Yes     Partners: Female     Birth control/protection: None       Current Outpatient Medications:     DULoxetine (CYMBALTA) 60 MG capsule, TAKE 1 CAPSULE BY MOUTH DAILY, Disp: 90 capsule, Rfl: 1    lisinopril (PRINIVIL,ZESTRIL) 10 MG tablet, TAKE 1 TABLET BY MOUTH EVERY DAY, Disp: 90 tablet, Rfl: 3    multivitamin with minerals (MULTIVITAMIN ADULTS PO), Take 1 tablet by mouth Daily. Gummy, patient avoids vitamins with Iron., Disp: , Rfl:

## 2025-05-05 ENCOUNTER — HOSPITAL ENCOUNTER (OUTPATIENT)
Dept: CT IMAGING | Facility: HOSPITAL | Age: 60
Discharge: HOME | End: 2025-05-05
Admitting: NURSE PRACTITIONER
Payer: COMMERCIAL

## 2025-05-05 PROCEDURE — 71271 CT THORAX LUNG CANCER SCR C-: CPT

## 2025-05-07 ENCOUNTER — TELEPHONE (OUTPATIENT)
Dept: FAMILY MEDICINE CLINIC | Facility: CLINIC | Age: 60
End: 2025-05-07
Payer: COMMERCIAL

## 2025-05-09 ENCOUNTER — TELEPHONE (OUTPATIENT)
Dept: GASTROENTEROLOGY | Facility: CLINIC | Age: 60
End: 2025-05-09
Payer: COMMERCIAL

## 2025-05-16 ENCOUNTER — TELEPHONE (OUTPATIENT)
Dept: GASTROENTEROLOGY | Facility: CLINIC | Age: 60
End: 2025-05-16
Payer: COMMERCIAL

## 2025-06-19 NOTE — PAT
Left message on voicemail with arrival time of  0900.    Come to [facility], entrance [address]. Bring picture ID and insurance card, have  over 18 to give ride home.     Bring medication list but do not take any meds except inhalers that morning. Bring medications with you to the hospital, including inhalers.     Complete prep prior to arrival. Clear liquids all day the day before, and start prep as instructed.     Complete prep and any water may need to drink at least 2 hours prior to arrival. No gum, mints, water, or anything else in mouth after that.      Plan to be here at least 3-4 hours. Do not bring any valuables with you to the hospital.     Call [phone] with any questions.

## 2025-06-20 NOTE — PAT
Pt verifies received previous message, verbalizes understanding of instructions, verifies arrival time of  0900.  Will be here for procedure and has  lined up.

## 2025-06-27 ENCOUNTER — ANESTHESIA EVENT (OUTPATIENT)
Dept: GASTROENTEROLOGY | Facility: HOSPITAL | Age: 60
End: 2025-06-27
Payer: COMMERCIAL

## 2025-06-27 NOTE — ANESTHESIA PREPROCEDURE EVALUATION
Anesthesia Evaluation     Patient summary reviewed and Nursing notes reviewed   NPO Solid Status: > 8 hours  NPO Liquid Status: > 4 hours           Airway   Mallampati: II  TM distance: >3 FB  Neck ROM: full  Possible difficult intubation  Dental - normal exam         Pulmonary - normal exam   (+) a smoker Current, Smoked day of surgery, cigarettes, asthma,sleep apnea  Cardiovascular - normal exam    ECG reviewed    (+) hypertension less than 2 medications, hyperlipidemia    ROS comment: EKG 7/15/21  HEART RATE= 97  bpm    - NORMAL ECG -  Sinus rhythm  NO PRIOR TRACING AVAILABLE FOR COMPARISON  Electronically Signed By: Janak Cortés ([facility]) 15-Jul-2021 17:24:07  Date and Time of Study: 2021-07-15 15:35:14        Neuro/Psych  (+) numbness, psychiatric history Anxiety and Depression  GI/Hepatic/Renal/Endo    (+) obesity, morbid obesity    Musculoskeletal     (+) neck pain  Abdominal   (+) obese   Substance History      OB/GYN          Other   arthritis,                   Anesthesia Plan    ASA 2     general   total IV anesthesia  (Total IV Anesthesia    Patient understands anesthesia not responsible for dental damage.  )  intravenous induction     Anesthetic plan, risks, benefits, and alternatives have been provided, discussed and informed consent has been obtained with: patient and spouse/significant other.    Plan discussed with CRNA.      CODE STATUS:

## 2025-06-30 ENCOUNTER — ANESTHESIA (OUTPATIENT)
Dept: GASTROENTEROLOGY | Facility: HOSPITAL | Age: 60
End: 2025-06-30
Payer: COMMERCIAL

## 2025-06-30 ENCOUNTER — HOSPITAL ENCOUNTER (OUTPATIENT)
Facility: HOSPITAL | Age: 60
Setting detail: HOSPITAL OUTPATIENT SURGERY
Discharge: HOME | End: 2025-06-30
Attending: INTERNAL MEDICINE | Admitting: NURSE ANESTHETIST, CERTIFIED REGISTERED
Payer: COMMERCIAL

## 2025-06-30 VITALS
DIASTOLIC BLOOD PRESSURE: 76 MMHG | RESPIRATION RATE: 22 BRPM | HEIGHT: 74 IN | OXYGEN SATURATION: 95 % | TEMPERATURE: 97.8 F | WEIGHT: 315 LBS | HEART RATE: 94 BPM | SYSTOLIC BLOOD PRESSURE: 138 MMHG | BODY MASS INDEX: 40.43 KG/M2

## 2025-06-30 DIAGNOSIS — R19.5 POSITIVE COLORECTAL CANCER SCREENING USING COLOGUARD TEST: ICD-10-CM

## 2025-06-30 DIAGNOSIS — Z86.0100 HISTORY OF COLON POLYPS: ICD-10-CM

## 2025-06-30 PROCEDURE — 25010000002 GLYCOPYRROLATE 0.2 MG/ML SOLUTION: Performed by: NURSE ANESTHETIST, CERTIFIED REGISTERED

## 2025-06-30 PROCEDURE — 25810000003 LACTATED RINGERS PER 1000 ML: Performed by: NURSE ANESTHETIST, CERTIFIED REGISTERED

## 2025-06-30 PROCEDURE — 88305 TISSUE EXAM BY PATHOLOGIST: CPT | Performed by: INTERNAL MEDICINE

## 2025-06-30 PROCEDURE — 25010000002 LIDOCAINE PF 2% 2 % SOLUTION: Performed by: NURSE ANESTHETIST, CERTIFIED REGISTERED

## 2025-06-30 PROCEDURE — 25010000002 PROPOFOL 10 MG/ML EMULSION: Performed by: NURSE ANESTHETIST, CERTIFIED REGISTERED

## 2025-06-30 DEVICE — CLIP
Type: IMPLANTABLE DEVICE | Site: TRANSVERSE COLON | Status: FUNCTIONAL
Brand: MANTIS™ CLIP

## 2025-06-30 DEVICE — DEV CLIP HEMO RESOLUTION360/ULTR 235CM 17MM STRL: Type: IMPLANTABLE DEVICE | Site: TRANSVERSE COLON | Status: FUNCTIONAL

## 2025-06-30 RX ORDER — SODIUM CHLORIDE, SODIUM LACTATE, POTASSIUM CHLORIDE, CALCIUM CHLORIDE 600; 310; 30; 20 MG/100ML; MG/100ML; MG/100ML; MG/100ML
30 INJECTION, SOLUTION INTRAVENOUS CONTINUOUS
Status: DISCONTINUED | OUTPATIENT
Start: 2025-06-30 | End: 2025-06-30 | Stop reason: HOSPADM

## 2025-06-30 RX ORDER — LIDOCAINE HYDROCHLORIDE 20 MG/ML
INJECTION, SOLUTION EPIDURAL; INFILTRATION; INTRACAUDAL; PERINEURAL AS NEEDED
Status: DISCONTINUED | OUTPATIENT
Start: 2025-06-30 | End: 2025-06-30 | Stop reason: SURG

## 2025-06-30 RX ORDER — GLYCOPYRROLATE 0.2 MG/ML
INJECTION INTRAMUSCULAR; INTRAVENOUS AS NEEDED
Status: DISCONTINUED | OUTPATIENT
Start: 2025-06-30 | End: 2025-06-30 | Stop reason: SURG

## 2025-06-30 RX ORDER — PROPOFOL 10 MG/ML
VIAL (ML) INTRAVENOUS AS NEEDED
Status: DISCONTINUED | OUTPATIENT
Start: 2025-06-30 | End: 2025-06-30 | Stop reason: SURG

## 2025-06-30 RX ADMIN — GLYCOPYRROLATE 0.2 MG: 0.2 INJECTION INTRAMUSCULAR; INTRAVENOUS at 10:22

## 2025-06-30 RX ADMIN — PROPOFOL 250 MCG/KG/MIN: 10 INJECTION, EMULSION INTRAVENOUS at 10:21

## 2025-06-30 RX ADMIN — LIDOCAINE HYDROCHLORIDE 40 MG: 20 INJECTION, SOLUTION INTRAVENOUS at 11:13

## 2025-06-30 RX ADMIN — SODIUM CHLORIDE, POTASSIUM CHLORIDE, SODIUM LACTATE AND CALCIUM CHLORIDE 30 ML/HR: 600; 310; 30; 20 INJECTION, SOLUTION INTRAVENOUS at 09:46

## 2025-06-30 RX ADMIN — LIDOCAINE HYDROCHLORIDE 40 MG: 20 INJECTION, SOLUTION INTRAVENOUS at 10:21

## 2025-06-30 RX ADMIN — PROPOFOL 100 MG: 10 INJECTION, EMULSION INTRAVENOUS at 10:21

## 2025-06-30 NOTE — H&P
Pre Procedure History & Physical    Chief Complaint:   Positive Cologuard    Subjective     HPI:   61 yo M here for eval of positive Cologuard.    Past Medical History:   Past Medical History:   Diagnosis Date    Acromioclavicular separation left, 1984    Anxiety not diagnosed, just feel that way now    2021    Arthritis     Arthritis of left acromioclavicular joint     Arthritis of right acromioclavicular joint     Asthma apnea    Cataract     DDD (degenerative disc disease), cervical     Deviated septum     Dislocation, shoulder left, 1984    Frozen shoulder 3 months ago sporadic    History of high cholesterol     Hyperlipidemia     Hypertension     Low back pain     Neck pain     Neuromuscular disorder     Sleep apnea     CPAP    Torn rotator cuff     RIGHT       Past Surgical History:  Past Surgical History:   Procedure Laterality Date    BICEPS TENDON REPAIR Right     2007 approx    COLONOSCOPY N/A 4/26/2018    Procedure: COLONOSCOPY into left colon with biopsies;  Surgeon: Robles Lopez MD;  Location: [facility] ENDOSCOPY;  Service: Gastroenterology    COLONOSCOPY N/A 5/24/2018    Procedure: COLONOSCOPY into cecum with saline lift, hot snare polypectomy, clip;  Surgeon: Robles Lopez MD;  Location: [facility] ENDOSCOPY;  Service: Gastroenterology    SHOULDER ARTHROSCOPY W/ ROTATOR CUFF REPAIR Right 7/16/2021    Procedure: SHOULDER ARTHROSCOPY WITH ROTATOR CUFF REPAIR, labral tear debridement and edgard;  Surgeon: Dandre Titus MD;  Location: [facility] OR [facility];  Service: Orthopedics;  Laterality: Right;    SHOULDER ARTHROSCOPY W/ ROTATOR CUFF REPAIR Right 12/17/2021    Procedure: Right shoulder arthroscopy with mini open rotator cuff repair with possible patch augmentation;  Surgeon: Dandre Titus MD;  Location: [facility] OR [facility];  Service: Orthopedics;  Laterality: Right;       Family History:  Family History   Problem Relation Age of Onset    Lung cancer Father     Kidney disease Father     Cancer Father   "   Kidney disease Brother     Cancer Paternal Aunt     Osteoporosis Maternal Grandmother     Chantal Hyperthermia Neg Hx     Colon cancer Neg Hx        Social History:   reports that he has been smoking cigarettes. He started smoking about 40 years ago. He has a 40.1 pack-year smoking history. He has never used smokeless tobacco. He reports current alcohol use. He reports that he does not use drugs.    Medications:   Medications Prior to Admission   Medication Sig Dispense Refill Last Dose/Taking    DULoxetine (CYMBALTA) 60 MG capsule TAKE 1 CAPSULE BY MOUTH DAILY 90 capsule 1 6/29/2025    lisinopril (PRINIVIL,ZESTRIL) 10 MG tablet TAKE 1 TABLET BY MOUTH EVERY DAY 90 tablet 3 6/29/2025    multivitamin with minerals (MULTIVITAMIN ADULTS PO) Take 1 tablet by mouth Daily. Gummy, patient avoids vitamins with Iron.   6/29/2025       Allergies:  Patient has no known allergies.    ROS:    Pertinent items are noted in HPI     Objective     Blood pressure 139/75, pulse 87, temperature 97.2 °F (36.2 °C), temperature source Temporal, resp. rate 12, height 188 cm (74\"), weight (!) 144 kg (317 lb 10.9 oz), SpO2 94%.    Physical Exam   Constitutional: Pt is oriented to person, place, and time and well-developed, well-nourished, and in no distress.   Mouth/Throat: Oropharynx is clear and moist.   Neck: Normal range of motion.   Cardiovascular: Normal rate, regular rhythm and normal heart sounds.    Pulmonary/Chest: Effort normal and breath sounds normal.   Abdominal: Soft. Nontender  Skin: Skin is warm and dry.   Psychiatric: Mood, memory, affect and judgment normal.     Assessment & Plan     Diagnosis:  Positive Cologuard    Anticipated Surgical Procedure:  Colonoscopy    The risks, benefits, and alternatives of this procedure have been discussed with the patient or the responsible party- the patient understands and agrees to proceed.           "

## 2025-06-30 NOTE — ANESTHESIA POSTPROCEDURE EVALUATION
Patient: YAN Morris    Procedure Summary       Date: 06/30/25 Room / Location: [facility] ENDOSCOPY 2 / [facility] ENDOSCOPY    Anesthesia Start: 1019 Anesthesia Stop: 1121    Procedure: COLONOSCOPY WITH COLD/HOT SNARE POLYPECTOMIES, ELEVIEW INJECTION, CLIP APPLICATION X5 AND COLON TATTOOING Diagnosis:       Positive colorectal cancer screening using Cologuard test      History of colon polyps      (Positive colorectal cancer screening using Cologuard test [R19.5])      (History of colon polyps [Z86.0100])    Surgeons: Jennifer Longoria MD Provider: Pilar Marie CRNA    Anesthesia Type: general ASA Status: 2            Anesthesia Type: general    Vitals  Vitals Value Taken Time   /76 06/30/25 11:34   Temp 36.6 °C (97.8 °F) 06/30/25 11:34   Pulse 94 06/30/25 11:34   Resp 22 06/30/25 11:34   SpO2 95 % 06/30/25 11:34           Post Anesthesia Care and Evaluation    Patient location during evaluation: bedside  Patient participation: complete - patient participated  Level of consciousness: awake  Pain management: adequate    Airway patency: patent  Anesthetic complications: No anesthetic complications  PONV Status: controlled  Cardiovascular status: acceptable and stable  Respiratory status: acceptable

## 2025-07-01 ENCOUNTER — RESULTS FOLLOW-UP (OUTPATIENT)
Dept: GASTROENTEROLOGY | Facility: HOSPITAL | Age: 60
End: 2025-07-01
Payer: COMMERCIAL

## 2025-07-01 LAB
CYTO UR: NORMAL
LAB AP CASE REPORT: NORMAL
LAB AP CLINICAL INFORMATION: NORMAL
PATH REPORT.FINAL DX SPEC: NORMAL
PATH REPORT.GROSS SPEC: NORMAL

## 2025-07-03 ENCOUNTER — PREP FOR SURGERY (OUTPATIENT)
Dept: OTHER | Facility: HOSPITAL | Age: 60
End: 2025-07-03
Payer: COMMERCIAL

## 2025-07-03 DIAGNOSIS — Z86.0100 HISTORY OF COLON POLYPS: Primary | ICD-10-CM

## 2025-07-03 RX ORDER — SODIUM, POTASSIUM,MAG SULFATES 17.5-3.13G
2 SOLUTION, RECONSTITUTED, ORAL ORAL ONCE
Qty: 354 ML | Refills: 0 | Status: SHIPPED | OUTPATIENT
Start: 2025-07-03 | End: 2025-07-03

## 2025-07-03 NOTE — TELEPHONE ENCOUNTER
Attempted to contact scheduling, phone line rang several times then went to busy tone. Unsure if scheduling has left for the day. Will need to schedule on Monday.

## 2025-07-03 NOTE — TELEPHONE ENCOUNTER
Please let patient know that colon polyp biopsies are benign. Recommend repeat colonoscopy in 6 months to follow-up on piecemeal polyp removal. Please schedule.       Pt is aware and states understanding.    Pt is agreeable to scheduling procedure on 12/8/2025    Reviewed instructions with pt confirmed no clearances needed   Mychart instructions sent.    Please place orders piecemeal polyp is not an option for dx on my SmartList.

## 2025-08-13 ENCOUNTER — TELEPHONE (OUTPATIENT)
Dept: FAMILY MEDICINE CLINIC | Facility: CLINIC | Age: 60
End: 2025-08-13
Payer: COMMERCIAL

## (undated) DEVICE — GLV SURG SIGNATURE ESSENTIAL PF LTX SZ8

## (undated) DEVICE — BLD SHAVER BONECUTTER 5MM 13CM

## (undated) DEVICE — DRSNG WND GZ CURAD OIL EMULSION 3X3IN STRL

## (undated) DEVICE — CANN TRPL DAM 7X7MM NO VLV

## (undated) DEVICE — GLV SURG SIGNATURE ESSENTIAL PF LTX SZ8.5

## (undated) DEVICE — IRRIGATOR BULB ASEPTO 60CC STRL

## (undated) DEVICE — SYR LUERLOK 30CC

## (undated) DEVICE — PENCL E/S ULTRAVAC TELESCP NOSE HOLSTR 10FT

## (undated) DEVICE — SUT VIC 3/0 SH 27IN J416H

## (undated) DEVICE — LINER SURG CANSTR SXN S/RIGD 1500CC

## (undated) DEVICE — INTENT TO BE USED WITH SUTURE MATERIAL FOR TISSUE CLOSURE: Brand: RICHARD-ALLAN® NEEDLE 1/2 CIRCLE TAPER

## (undated) DEVICE — Device: Brand: DISPOSABLE ELECTROSURGICAL SNARE

## (undated) DEVICE — SNAR POLYP CAPTIFLEX XS/OVL 11X2.4MM 240CM 1P/U

## (undated) DEVICE — ZIPPERED TOGA, 2X LARGE: Brand: FLYTE, SURGICOOL

## (undated) DEVICE — OPTIFOAM GENTLE SA, POSTOP, 4X8: Brand: MEDLINE

## (undated) DEVICE — PATIENT RETURN ELECTRODE, SINGLE-USE, CONTACT QUALITY MONITORING, ADULT, WITH 9FT CORD, FOR PATIENTS WEIGING OVER 33LBS. (15KG): Brand: MEGADYNE

## (undated) DEVICE — INTENDED FOR TISSUE SEPARATION, AND OTHER PROCEDURES THAT REQUIRE A SHARP SURGICAL BLADE TO PUNCTURE OR CUT.: Brand: BARD-PARKER ® CARBON RIB-BACK BLADES

## (undated) DEVICE — CANNULA SMOOTH FLEX 6.5 X 72MM: Brand: CLEAR-TRAC

## (undated) DEVICE — DRAPE,SHOULDER,BEACH CHAIR,STERILE: Brand: MEDLINE

## (undated) DEVICE — CANN NASL CO2 TRULINK W/O2 A/

## (undated) DEVICE — SNAR POLYP SENSATION STDOVL 27 240 BX40

## (undated) DEVICE — SUT VIC 2/0 CT1 36IN

## (undated) DEVICE — THE SINGLE USE ETRAP – POLYP TRAP IS USED FOR SUCTION RETRIEVAL OF ENDOSCOPICALLY REMOVED POLYPS.: Brand: ETRAP

## (undated) DEVICE — SKIN PREP TRAY W/CHG: Brand: MEDLINE INDUSTRIES, INC.

## (undated) DEVICE — THE TORRENT IRRIGATION SCOPE CONNECTOR IS USED WITH THE TORRENT IRRIGATION TUBING TO PROVIDE IRRIGATION FLUIDS SUCH AS STERILE WATER DURING GASTROINTESTINAL ENDOSCOPIC PROCEDURES WHEN USED IN CONJUNCTION WITH AN IRRIGATION PUMP (OR ELECTROSURGICAL UNIT).: Brand: TORRENT

## (undated) DEVICE — APPL CHLORAPREP HI/LITE 26ML ORNG

## (undated) DEVICE — ARM SLING: Brand: DEROYAL

## (undated) DEVICE — Device: Brand: DEFENDO AIR/WATER/SUCTION AND BIOPSY VALVE

## (undated) DEVICE — Device: Brand: SINGLE USE INJECTOR NM600/610

## (undated) DEVICE — IMMOB SHLDR PAD2 UNIV LG

## (undated) DEVICE — PK ARTHSCP SHLDR TOWER 40

## (undated) DEVICE — 3M™ STERI-STRIP™ REINFORCED ADHESIVE SKIN CLOSURES, R1547, 1/2 IN X 4 IN (12 MM X 100 MM), 6 STRIPS/ENVELOPE: Brand: 3M™ STERI-STRIP™

## (undated) DEVICE — BUR SHAVER FLUSHCUT 8FLUT 5MM 13CM

## (undated) DEVICE — 1016 S-DRAPE IRRIG POUCH 10/BOX: Brand: STERI-DRAPE™

## (undated) DEVICE — ANTIBACTERIAL UNDYED BRAIDED (POLYGLACTIN 910), SYNTHETIC ABSORBABLE SUTURE: Brand: COATED VICRYL

## (undated) DEVICE — TBG ARTHSCP PT W CONN/REDUC 8FT

## (undated) DEVICE — ZIP 16 SURGICAL SKIN CLOSURE DEVICE, PSA: Brand: ZIP 16 SURGICAL SKIN CLOSURE DEVICE

## (undated) DEVICE — DEFENDO AIR WATER SUCTION AND BIOPSY VALVE KIT FOR  OLYMPUS: Brand: DEFENDO AIR/WATER/SUCTION AND BIOPSY VALVE

## (undated) DEVICE — INJ SUB/MUCUS ELEVIEW FOR/GI/ENDO/PROC AMPL/10ML

## (undated) DEVICE — GLV SURG PREMIERPRO ORTHO LTX PF SZ8.5 BRN

## (undated) DEVICE — ADHS SKIN SURG TISS VISC PREMIERPRO EXOFIN HI/VISC FAST/DRY

## (undated) DEVICE — GLV SURG BIOGEL LTX PF 7

## (undated) DEVICE — INTEGUSEAL MICROBIAL SEALANT: Brand: AVANOS

## (undated) DEVICE — TUBING, SUCTION, 1/4" X 10', STRAIGHT: Brand: MEDLINE

## (undated) DEVICE — Device

## (undated) DEVICE — SNAR E/S POLYP SNAREMASTER OVL/10MM 2.8X2300MM YEL

## (undated) DEVICE — CONN JET HYDRA H20 AUXILIARY DISP

## (undated) DEVICE — SINGLE-USE BIOPSY FORCEPS: Brand: RADIAL JAW 4

## (undated) DEVICE — TBG ARTHSCP PUMP W CONN/REDUC 8FT

## (undated) DEVICE — SUT VIC 0 CT1 36IN J946H

## (undated) DEVICE — SUT MNCRYL 4/0 PS2 18 IN

## (undated) DEVICE — 3M™ IOBAN™ 2 ANTIMICROBIAL INCISE DRAPE 6650EZ: Brand: IOBAN™ 2

## (undated) DEVICE — DRAPE,U/ SHT,SPLIT,PLAS,STERIL: Brand: MEDLINE

## (undated) DEVICE — THE CARR-LOCKE INJECTION NEEDLE IS A SINGLE USE, DISPOSABLE, FLEXIBLE SHEATH INJECTION NEEDLE USED FOR THE INJECTION OF VARIOUS TYPES OF MEDIA THROUGH FLEXIBLE ENDOSCOPES.

## (undated) DEVICE — SUREFIT, DUAL DISPERSIVE ELECTRODE, CONTACT QUALITY MONITOR: Brand: SUREFIT

## (undated) DEVICE — SOL IRRG H2O PL/BG 1000ML STRL

## (undated) DEVICE — ABL ASP APOLLO RF XL 90D

## (undated) DEVICE — Device: Brand: BLACK EYE

## (undated) DEVICE — SOLIDIFIER LIQLOC PLS 1500CC BT